# Patient Record
Sex: FEMALE | Race: WHITE | URBAN - METROPOLITAN AREA
[De-identification: names, ages, dates, MRNs, and addresses within clinical notes are randomized per-mention and may not be internally consistent; named-entity substitution may affect disease eponyms.]

---

## 2019-04-07 ENCOUNTER — HOSPITAL ENCOUNTER (OUTPATIENT)
Dept: ADMINISTRATIVE | Facility: HOSPITAL | Age: 49
End: 2019-04-09
Attending: HOSPITALIST | Admitting: INTERNAL MEDICINE

## 2019-04-07 LAB
ABS NEUT (OLG): 3.72 X10(3)/MCL (ref 2.1–9.2)
ALBUMIN SERPL-MCNC: 3.1 GM/DL (ref 3.4–5)
ALBUMIN/GLOB SERPL: 1.1 RATIO (ref 1.1–2)
ALP SERPL-CCNC: 72 UNIT/L (ref 38–126)
ALT SERPL-CCNC: 34 UNIT/L (ref 12–78)
AST SERPL-CCNC: 28 UNIT/L (ref 15–37)
BASOPHILS # BLD AUTO: 0 X10(3)/MCL (ref 0–0.2)
BASOPHILS NFR BLD AUTO: 1 %
BILIRUB SERPL-MCNC: 0.3 MG/DL (ref 0.2–1)
BILIRUBIN DIRECT+TOT PNL SERPL-MCNC: 0.1 MG/DL (ref 0–0.5)
BILIRUBIN DIRECT+TOT PNL SERPL-MCNC: 0.2 MG/DL (ref 0–0.8)
BUN SERPL-MCNC: 30 MG/DL (ref 7–18)
CALCIUM SERPL-MCNC: 8.1 MG/DL (ref 8.5–10.1)
CHLORIDE SERPL-SCNC: 111 MMOL/L (ref 98–107)
CO2 SERPL-SCNC: 24 MMOL/L (ref 21–32)
CREAT SERPL-MCNC: 1.43 MG/DL (ref 0.55–1.02)
ERYTHROCYTE [DISTWIDTH] IN BLOOD BY AUTOMATED COUNT: 14.6 % (ref 11.5–17)
GLOBULIN SER-MCNC: 2.7 GM/DL (ref 2.4–3.5)
GLUCOSE SERPL-MCNC: 95 MG/DL (ref 74–106)
HCT VFR BLD AUTO: 33.8 % (ref 37–47)
HGB BLD-MCNC: 10.8 GM/DL (ref 12–16)
INR PPP: 1 (ref 0–1.3)
LYMPHOCYTES # BLD AUTO: 2.2 X10(3)/MCL (ref 0.6–4.6)
LYMPHOCYTES NFR BLD AUTO: 34 %
MAGNESIUM SERPL-MCNC: 1.8 MG/DL (ref 1.8–2.4)
MCH RBC QN AUTO: 28.7 PG (ref 27–31)
MCHC RBC AUTO-ENTMCNC: 32 GM/DL (ref 33–36)
MCV RBC AUTO: 89.9 FL (ref 80–94)
MONOCYTES # BLD AUTO: 0.3 X10(3)/MCL (ref 0.1–1.3)
MONOCYTES NFR BLD AUTO: 5 %
NEUTROPHILS # BLD AUTO: 3.72 X10(3)/MCL (ref 2.1–9.2)
NEUTROPHILS NFR BLD AUTO: 60 %
PLATELET # BLD AUTO: 188 X10(3)/MCL (ref 130–400)
PMV BLD AUTO: 10.7 FL (ref 9.4–12.4)
POC TROPONIN: 0 NG/ML (ref 0–0.08)
POTASSIUM SERPL-SCNC: 4.3 MMOL/L (ref 3.5–5.1)
PROT SERPL-MCNC: 5.8 GM/DL (ref 6.4–8.2)
PROTHROMBIN TIME: 13.8 SECOND(S) (ref 12–14)
RBC # BLD AUTO: 3.76 X10(6)/MCL (ref 4.2–5.4)
SODIUM SERPL-SCNC: 144 MMOL/L (ref 136–145)
WBC # SPEC AUTO: 6.3 X10(3)/MCL (ref 4.5–11.5)

## 2019-04-08 LAB
ABS NEUT (OLG): 4.03 X10(3)/MCL (ref 2.1–9.2)
AMPHET UR QL SCN: ABNORMAL
APPEARANCE, UA: CLEAR
BACTERIA SPEC CULT: ABNORMAL /HPF
BARBITURATE SCN PRESENT UR: ABNORMAL
BASOPHILS # BLD AUTO: 0 X10(3)/MCL (ref 0–0.2)
BASOPHILS NFR BLD AUTO: 0 %
BENZODIAZ UR QL SCN: ABNORMAL
BILIRUB UR QL STRIP: NEGATIVE
BUN SERPL-MCNC: 29 MG/DL (ref 7–18)
CALCIUM SERPL-MCNC: 7.8 MG/DL (ref 8.5–10.1)
CANNABINOIDS UR QL SCN: ABNORMAL
CHLORIDE SERPL-SCNC: 109 MMOL/L (ref 98–107)
CO2 SERPL-SCNC: 24 MMOL/L (ref 21–32)
COCAINE UR QL SCN: ABNORMAL
COLOR UR: YELLOW
CREAT SERPL-MCNC: 1.35 MG/DL (ref 0.55–1.02)
CREAT/UREA NIT SERPL: 21.5
ERYTHROCYTE [DISTWIDTH] IN BLOOD BY AUTOMATED COUNT: 14.7 % (ref 11.5–17)
GLUCOSE (UA): ABNORMAL
GLUCOSE SERPL-MCNC: 203 MG/DL (ref 74–106)
HCT VFR BLD AUTO: 30.6 % (ref 37–47)
HGB BLD-MCNC: 9.8 GM/DL (ref 12–16)
HGB UR QL STRIP: NEGATIVE
KETONES UR QL STRIP: NEGATIVE
LEUKOCYTE ESTERASE UR QL STRIP: ABNORMAL
LYMPHOCYTES # BLD AUTO: 2.2 X10(3)/MCL (ref 0.6–4.6)
LYMPHOCYTES NFR BLD AUTO: 33 %
MCH RBC QN AUTO: 28.6 PG (ref 27–31)
MCHC RBC AUTO-ENTMCNC: 32 GM/DL (ref 33–36)
MCV RBC AUTO: 89.2 FL (ref 80–94)
MONOCYTES # BLD AUTO: 0.4 X10(3)/MCL (ref 0.1–1.3)
MONOCYTES NFR BLD AUTO: 6 %
NEUTROPHILS # BLD AUTO: 4.03 X10(3)/MCL (ref 2.1–9.2)
NEUTROPHILS NFR BLD AUTO: 61 %
NITRITE UR QL STRIP: NEGATIVE
OPIATES UR QL SCN: ABNORMAL
PCP UR QL: ABNORMAL
PH UR STRIP.AUTO: 5 [PH] (ref 5–7.5)
PH UR STRIP: 5 [PH] (ref 5–9)
PLATELET # BLD AUTO: 184 X10(3)/MCL (ref 130–400)
PMV BLD AUTO: 10.8 FL (ref 9.4–12.4)
POTASSIUM SERPL-SCNC: 4.2 MMOL/L (ref 3.5–5.1)
PROT UR QL STRIP: NEGATIVE
RBC # BLD AUTO: 3.43 X10(6)/MCL (ref 4.2–5.4)
RBC #/AREA URNS HPF: ABNORMAL /[HPF]
SODIUM SERPL-SCNC: 141 MMOL/L (ref 136–145)
SP GR FLD REFRACTOMETRY: 1.05 (ref 1–1.03)
SP GR UR STRIP: 1.05 (ref 1–1.03)
SQUAMOUS EPITHELIAL, UA: 6 /HPF (ref 0–4)
TROPONIN I SERPL-MCNC: <0.02 NG/ML (ref 0.02–0.49)
UROBILINOGEN UR STRIP-ACNC: 0.2
WBC # SPEC AUTO: 6.6 X10(3)/MCL (ref 4.5–11.5)
WBC #/AREA URNS HPF: 7 /HPF (ref 0–3)

## 2019-04-09 LAB
BUN SERPL-MCNC: 25 MG/DL (ref 7–18)
CALCIUM SERPL-MCNC: 8 MG/DL (ref 8.5–10.1)
CHLORIDE SERPL-SCNC: 107 MMOL/L (ref 98–107)
CK MB SERPL-MCNC: <1 NG/ML (ref 0.5–3.6)
CK SERPL-CCNC: 56 UNIT/L (ref 26–192)
CO2 SERPL-SCNC: 29 MMOL/L (ref 21–32)
CREAT SERPL-MCNC: 1.3 MG/DL (ref 0.55–1.02)
CREAT/UREA NIT SERPL: 19.2
GLUCOSE SERPL-MCNC: 169 MG/DL (ref 74–106)
POTASSIUM SERPL-SCNC: 4.4 MMOL/L (ref 3.5–5.1)
SODIUM SERPL-SCNC: 142 MMOL/L (ref 136–145)
TROPONIN I SERPL-MCNC: <0.02 NG/ML (ref 0.02–0.49)

## 2019-05-13 ENCOUNTER — HISTORICAL (OUTPATIENT)
Dept: ENDOSCOPY | Facility: HOSPITAL | Age: 49
End: 2019-05-13

## 2019-06-29 ENCOUNTER — HOSPITAL ENCOUNTER (OUTPATIENT)
Dept: NUTRITION | Facility: HOSPITAL | Age: 49
End: 2019-07-01
Attending: INTERNAL MEDICINE | Admitting: INTERNAL MEDICINE

## 2019-06-29 LAB
ABS NEUT (OLG): 4.06 X10(3)/MCL (ref 2.1–9.2)
ALBUMIN SERPL-MCNC: 4.1 GM/DL (ref 3.4–5)
ALBUMIN/GLOB SERPL: 1.1 RATIO (ref 1.1–2)
ALP SERPL-CCNC: 112 UNIT/L (ref 38–126)
ALT SERPL-CCNC: 49 UNIT/L (ref 12–78)
APTT PPP: 29.8 SECOND(S) (ref 24.2–33.9)
AST SERPL-CCNC: 26 UNIT/L (ref 15–37)
BASOPHILS # BLD AUTO: 0 X10(3)/MCL (ref 0–0.2)
BASOPHILS NFR BLD AUTO: 1 %
BILIRUB SERPL-MCNC: 0.4 MG/DL (ref 0.2–1)
BILIRUBIN DIRECT+TOT PNL SERPL-MCNC: 0.1 MG/DL (ref 0–0.5)
BILIRUBIN DIRECT+TOT PNL SERPL-MCNC: 0.3 MG/DL (ref 0–0.8)
BNP BLD-MCNC: 70 PG/ML (ref 0–100)
BUN SERPL-MCNC: 22 MG/DL (ref 7–18)
CALCIUM SERPL-MCNC: 8.4 MG/DL (ref 8.5–10.1)
CHLORIDE SERPL-SCNC: 107 MMOL/L (ref 98–107)
CO2 SERPL-SCNC: 28 MMOL/L (ref 21–32)
CREAT SERPL-MCNC: 1.21 MG/DL (ref 0.55–1.02)
ERYTHROCYTE [DISTWIDTH] IN BLOOD BY AUTOMATED COUNT: 14.5 % (ref 11.5–17)
GLOBULIN SER-MCNC: 3.6 GM/DL (ref 2.4–3.5)
GLUCOSE SERPL-MCNC: 140 MG/DL (ref 74–106)
HCT VFR BLD AUTO: 32.6 % (ref 37–47)
HGB BLD-MCNC: 10.8 GM/DL (ref 12–16)
INR PPP: 1 (ref 0–1.3)
LYMPHOCYTES # BLD AUTO: 2.1 X10(3)/MCL (ref 0.6–4.6)
LYMPHOCYTES NFR BLD AUTO: 32 %
MCH RBC QN AUTO: 27.8 PG (ref 27–31)
MCHC RBC AUTO-ENTMCNC: 33.1 GM/DL (ref 33–36)
MCV RBC AUTO: 83.8 FL (ref 80–94)
MONOCYTES # BLD AUTO: 0.5 X10(3)/MCL (ref 0.1–1.3)
MONOCYTES NFR BLD AUTO: 7 %
NEUTROPHILS # BLD AUTO: 4.06 X10(3)/MCL (ref 2.1–9.2)
NEUTROPHILS NFR BLD AUTO: 61 %
PLATELET # BLD AUTO: 224 X10(3)/MCL (ref 130–400)
PMV BLD AUTO: 9.7 FL (ref 9.4–12.4)
POC TROPONIN: 0 NG/ML (ref 0–0.08)
POTASSIUM SERPL-SCNC: 3.5 MMOL/L (ref 3.5–5.1)
PROT SERPL-MCNC: 7.7 GM/DL (ref 6.4–8.2)
PROTHROMBIN TIME: 13.5 SECOND(S) (ref 12–14)
RBC # BLD AUTO: 3.89 X10(6)/MCL (ref 4.2–5.4)
SODIUM SERPL-SCNC: 140 MMOL/L (ref 136–145)
TROPONIN I SERPL-MCNC: <0.02 NG/ML (ref 0.02–0.49)
WBC # SPEC AUTO: 6.7 X10(3)/MCL (ref 4.5–11.5)

## 2019-06-30 LAB
CK MB SERPL-MCNC: 0.7 NG/ML (ref 0.5–3.6)
CK MB SERPL-MCNC: <0.5 NG/ML (ref 0.5–3.6)
CK SERPL-CCNC: 34 UNIT/L (ref 26–192)
CK SERPL-CCNC: 39 UNIT/L (ref 26–192)
MAGNESIUM SERPL-MCNC: 1.7 MG/DL (ref 1.8–2.4)
POTASSIUM SERPL-SCNC: 3.2 MMOL/L (ref 3.5–5.1)
TROPONIN I SERPL-MCNC: <0.02 NG/ML (ref 0.02–0.49)
TROPONIN I SERPL-MCNC: <0.02 NG/ML (ref 0.02–0.49)

## 2019-07-01 LAB
BUN SERPL-MCNC: 21 MG/DL (ref 7–18)
CALCIUM SERPL-MCNC: 8.3 MG/DL (ref 8.5–10.1)
CHLORIDE SERPL-SCNC: 102 MMOL/L (ref 98–107)
CO2 SERPL-SCNC: 24 MMOL/L (ref 21–32)
CREAT SERPL-MCNC: 1.09 MG/DL (ref 0.55–1.02)
CREAT/UREA NIT SERPL: 19.3
GLUCOSE SERPL-MCNC: 188 MG/DL (ref 74–106)
MAGNESIUM SERPL-MCNC: 2.3 MG/DL (ref 1.8–2.4)
POTASSIUM SERPL-SCNC: 4.1 MMOL/L (ref 3.5–5.1)
SODIUM SERPL-SCNC: 137 MMOL/L (ref 136–145)

## 2019-07-18 ENCOUNTER — HISTORICAL (OUTPATIENT)
Dept: CARDIOLOGY | Facility: HOSPITAL | Age: 49
End: 2019-07-18

## 2019-08-14 ENCOUNTER — HISTORICAL (OUTPATIENT)
Dept: ADMINISTRATIVE | Facility: HOSPITAL | Age: 49
End: 2019-08-14

## 2019-08-22 ENCOUNTER — HISTORICAL (OUTPATIENT)
Dept: ADMINISTRATIVE | Facility: HOSPITAL | Age: 49
End: 2019-08-22

## 2019-09-12 ENCOUNTER — HOSPITAL ENCOUNTER (OUTPATIENT)
Dept: NUTRITION | Facility: HOSPITAL | Age: 49
End: 2019-09-13
Attending: INTERNAL MEDICINE | Admitting: INTERNAL MEDICINE

## 2019-09-12 LAB
ABS NEUT (OLG): 4.56 X10(3)/MCL (ref 2.1–9.2)
ALBUMIN SERPL-MCNC: 4.2 GM/DL (ref 3.4–5)
ALBUMIN/GLOB SERPL: 1.3 RATIO (ref 1.1–2)
ALP SERPL-CCNC: 88 UNIT/L (ref 38–126)
ALT SERPL-CCNC: 16 UNIT/L (ref 12–78)
AMPHET UR QL SCN: NEGATIVE
APPEARANCE, UA: CLEAR
APTT PPP: 28.1 SECOND(S) (ref 24.2–33.9)
AST SERPL-CCNC: 14 UNIT/L (ref 15–37)
BACTERIA SPEC CULT: ABNORMAL /HPF
BARBITURATE SCN PRESENT UR: NEGATIVE
BASOPHILS # BLD AUTO: 0 X10(3)/MCL (ref 0–0.2)
BASOPHILS NFR BLD AUTO: 1 %
BENZODIAZ UR QL SCN: POSITIVE
BILIRUB SERPL-MCNC: 0.5 MG/DL (ref 0.2–1)
BILIRUB UR QL STRIP: NEGATIVE
BILIRUBIN DIRECT+TOT PNL SERPL-MCNC: 0.1 MG/DL (ref 0–0.5)
BILIRUBIN DIRECT+TOT PNL SERPL-MCNC: 0.4 MG/DL (ref 0–0.8)
BNP BLD-MCNC: 438 PG/ML (ref 0–125)
BNP BLD-MCNC: 812 PG/ML (ref 0–100)
BUN SERPL-MCNC: 11 MG/DL (ref 7–18)
CALCIUM SERPL-MCNC: 8.6 MG/DL (ref 8.5–10.1)
CANNABINOIDS UR QL SCN: NEGATIVE
CHLORIDE SERPL-SCNC: 104 MMOL/L (ref 98–107)
CK MB SERPL-MCNC: <0.5 NG/ML (ref 0.5–3.6)
CK MB SERPL-MCNC: <0.5 NG/ML (ref 0.5–3.6)
CK SERPL-CCNC: 23 UNIT/L (ref 26–192)
CK SERPL-CCNC: 31 UNIT/L (ref 26–192)
CO2 SERPL-SCNC: 25 MMOL/L (ref 21–32)
COCAINE UR QL SCN: NEGATIVE
COLOR UR: YELLOW
CREAT SERPL-MCNC: 1.04 MG/DL (ref 0.55–1.02)
EOSINOPHIL # BLD AUTO: 0 X10(3)/MCL (ref 0–0.9)
EOSINOPHIL NFR BLD AUTO: 1 %
ERYTHROCYTE [DISTWIDTH] IN BLOOD BY AUTOMATED COUNT: 12.9 % (ref 11.5–17)
GLOBULIN SER-MCNC: 3.3 GM/DL (ref 2.4–3.5)
GLUCOSE (UA): NEGATIVE
GLUCOSE SERPL-MCNC: 139 MG/DL (ref 74–106)
HCT VFR BLD AUTO: 36.5 % (ref 37–47)
HGB BLD-MCNC: 11.9 GM/DL (ref 12–16)
HGB UR QL STRIP: NEGATIVE
INR PPP: 1.1 (ref 0–1.3)
KETONES UR QL STRIP: ABNORMAL
LEUKOCYTE ESTERASE UR QL STRIP: NEGATIVE
LYMPHOCYTES # BLD AUTO: 1.4 X10(3)/MCL (ref 0.6–4.6)
LYMPHOCYTES NFR BLD AUTO: 21 %
MAGNESIUM SERPL-MCNC: 1.8 MG/DL (ref 1.8–2.4)
MCH RBC QN AUTO: 27.5 PG (ref 27–31)
MCHC RBC AUTO-ENTMCNC: 32.6 GM/DL (ref 33–36)
MCV RBC AUTO: 84.5 FL (ref 80–94)
MONOCYTES # BLD AUTO: 0.5 X10(3)/MCL (ref 0.1–1.3)
MONOCYTES NFR BLD AUTO: 7 %
NEUTROPHILS # BLD AUTO: 4.56 X10(3)/MCL (ref 2.1–9.2)
NEUTROPHILS NFR BLD AUTO: 67 %
NITRITE UR QL STRIP: NEGATIVE
OPIATES UR QL SCN: POSITIVE
PCP UR QL: NEGATIVE
PH UR STRIP.AUTO: 5.5 [PH] (ref 5–7.5)
PH UR STRIP: 5.5 [PH] (ref 5–9)
PHOSPHATE SERPL-MCNC: 4 MG/DL (ref 2.5–4.9)
PLATELET # BLD AUTO: 317 X10(3)/MCL (ref 130–400)
PMV BLD AUTO: 9.6 FL (ref 9.4–12.4)
POC TROPONIN: 0 NG/ML (ref 0–0.08)
POTASSIUM SERPL-SCNC: 3.5 MMOL/L (ref 3.5–5.1)
PROT SERPL-MCNC: 7.5 GM/DL (ref 6.4–8.2)
PROT UR QL STRIP: ABNORMAL
PROTHROMBIN TIME: 13.9 SECOND(S) (ref 12–14)
RBC # BLD AUTO: 4.32 X10(6)/MCL (ref 4.2–5.4)
RBC #/AREA URNS HPF: ABNORMAL /[HPF]
SODIUM SERPL-SCNC: 140 MMOL/L (ref 136–145)
SP GR FLD REFRACTOMETRY: 1.02 (ref 1–1.03)
SP GR UR STRIP: 1.02 (ref 1–1.03)
SQUAMOUS EPITHELIAL, UA: ABNORMAL
TROPONIN I SERPL-MCNC: <0.02 NG/ML (ref 0.02–0.49)
TSH SERPL-ACNC: 3.18 MIU/L (ref 0.36–3.74)
UROBILINOGEN UR STRIP-ACNC: 0.2
WBC # SPEC AUTO: 6.8 X10(3)/MCL (ref 4.5–11.5)
WBC #/AREA URNS HPF: ABNORMAL /[HPF]

## 2019-09-13 LAB
CK MB SERPL-MCNC: <0.5 NG/ML (ref 0.5–3.6)
CK SERPL-CCNC: 36 UNIT/L (ref 26–192)
TROPONIN I SERPL-MCNC: <0.02 NG/ML (ref 0.02–0.49)

## 2019-10-29 ENCOUNTER — HOSPITAL ENCOUNTER (OUTPATIENT)
Dept: HOSPITAL 67 - MAMMO | Age: 49
Discharge: HOME | End: 2019-10-29
Attending: NURSE PRACTITIONER
Payer: COMMERCIAL

## 2019-10-29 DIAGNOSIS — Z12.31: Primary | ICD-10-CM

## 2020-02-24 ENCOUNTER — HOSPITAL ENCOUNTER (OUTPATIENT)
Dept: MEDSURG UNIT | Facility: HOSPITAL | Age: 50
End: 2020-02-28
Attending: INTERNAL MEDICINE | Admitting: INTERNAL MEDICINE

## 2020-02-24 LAB
ABS NEUT (OLG): 4.73 X10(3)/MCL (ref 2.1–9.2)
ALBUMIN SERPL-MCNC: 4.3 GM/DL (ref 3.4–5)
ALBUMIN/GLOB SERPL: 1.2 RATIO (ref 1.1–2)
ALP SERPL-CCNC: 117 UNIT/L (ref 38–126)
ALT SERPL-CCNC: 47 UNIT/L (ref 12–78)
APTT PPP: 36.7 SECOND(S) (ref 23.2–33.7)
AST SERPL-CCNC: 57 UNIT/L (ref 15–37)
BASOPHILS # BLD AUTO: 0 X10(3)/MCL (ref 0–0.2)
BASOPHILS NFR BLD AUTO: 1 %
BILIRUB SERPL-MCNC: 0.8 MG/DL (ref 0.2–1)
BILIRUBIN DIRECT+TOT PNL SERPL-MCNC: 0 MG/DL (ref 0–0.5)
BILIRUBIN DIRECT+TOT PNL SERPL-MCNC: 0.8 MG/DL (ref 0–0.8)
BNP BLD-MCNC: 22 PG/ML (ref 0–100)
BUN SERPL-MCNC: 17 MG/DL (ref 7–18)
CALCIUM SERPL-MCNC: 9.2 MG/DL (ref 8.5–10.1)
CHLORIDE SERPL-SCNC: 102 MMOL/L (ref 98–107)
CO2 SERPL-SCNC: 28 MMOL/L (ref 21–32)
CREAT SERPL-MCNC: 0.92 MG/DL (ref 0.55–1.02)
EOSINOPHIL # BLD AUTO: 0 X10(3)/MCL (ref 0–0.9)
EOSINOPHIL NFR BLD AUTO: 1 %
ERYTHROCYTE [DISTWIDTH] IN BLOOD BY AUTOMATED COUNT: 15.5 % (ref 11.5–17)
GLOBULIN SER-MCNC: 3.5 GM/DL (ref 2.4–3.5)
GLUCOSE SERPL-MCNC: 125 MG/DL (ref 74–106)
HCT VFR BLD AUTO: 35.3 % (ref 37–47)
HGB BLD-MCNC: 11.5 GM/DL (ref 12–16)
INR PPP: 1 (ref 0–1.3)
LYMPHOCYTES # BLD AUTO: 1.4 X10(3)/MCL (ref 0.6–4.6)
LYMPHOCYTES NFR BLD AUTO: 21 %
MCH RBC QN AUTO: 29 PG (ref 27–31)
MCHC RBC AUTO-ENTMCNC: 32.6 GM/DL (ref 33–36)
MCV RBC AUTO: 88.9 FL (ref 80–94)
MONOCYTES # BLD AUTO: 0.3 X10(3)/MCL (ref 0.1–1.3)
MONOCYTES NFR BLD AUTO: 5 %
NEUTROPHILS # BLD AUTO: 4.73 X10(3)/MCL (ref 2.1–9.2)
NEUTROPHILS NFR BLD AUTO: 72 %
PLATELET # BLD AUTO: 239 X10(3)/MCL (ref 130–400)
PMV BLD AUTO: 10.3 FL (ref 9.4–12.4)
POC TROPONIN: 0 NG/ML (ref 0–0.08)
POTASSIUM SERPL-SCNC: 4.7 MMOL/L (ref 3.5–5.1)
PROT SERPL-MCNC: 7.8 GM/DL (ref 6.4–8.2)
PROTHROMBIN TIME: 12.7 SECOND(S) (ref 11.1–13.7)
RBC # BLD AUTO: 3.97 X10(6)/MCL (ref 4.2–5.4)
SODIUM SERPL-SCNC: 138 MMOL/L (ref 136–145)
TROPONIN I SERPL-MCNC: <0.02 NG/ML (ref 0.02–0.49)
WBC # SPEC AUTO: 6.5 X10(3)/MCL (ref 4.5–11.5)

## 2020-02-25 LAB
TROPONIN I SERPL-MCNC: <0.02 NG/ML (ref 0.02–0.49)
TROPONIN I SERPL-MCNC: <0.02 NG/ML (ref 0.02–0.49)

## 2020-02-28 LAB — GRAM STN SPEC: NORMAL

## 2020-03-03 LAB — FINAL CULTURE: NORMAL

## 2020-05-01 ENCOUNTER — HISTORICAL (OUTPATIENT)
Dept: ADMINISTRATIVE | Facility: HOSPITAL | Age: 50
End: 2020-05-01

## 2020-06-09 ENCOUNTER — HISTORICAL (OUTPATIENT)
Dept: SURGERY | Facility: HOSPITAL | Age: 50
End: 2020-06-09

## 2021-01-20 ENCOUNTER — HOSPITAL ENCOUNTER (EMERGENCY)
Dept: HOSPITAL 67 - ED | Age: 51
Discharge: HOME | End: 2021-01-20
Payer: COMMERCIAL

## 2021-01-20 VITALS — WEIGHT: 118 LBS | BODY MASS INDEX: 19.66 KG/M2 | HEIGHT: 65 IN

## 2021-01-20 VITALS — DIASTOLIC BLOOD PRESSURE: 74 MMHG | TEMPERATURE: 98.6 F | SYSTOLIC BLOOD PRESSURE: 110 MMHG

## 2021-01-20 DIAGNOSIS — Z23: Primary | ICD-10-CM

## 2021-01-20 PROCEDURE — 90471 IMMUNIZATION ADMIN: CPT

## 2021-01-20 PROCEDURE — 99282 EMERGENCY DEPT VISIT SF MDM: CPT

## 2021-01-20 PROCEDURE — 90715 TDAP VACCINE 7 YRS/> IM: CPT

## 2021-03-08 ENCOUNTER — HOSPITAL ENCOUNTER (OUTPATIENT)
Dept: HOSPITAL 67 - US | Age: 51
Discharge: HOME | End: 2021-03-08
Attending: PHYSICIAN ASSISTANT
Payer: COMMERCIAL

## 2021-03-08 DIAGNOSIS — R22.1: Primary | ICD-10-CM

## 2021-03-08 DIAGNOSIS — E53.8: ICD-10-CM

## 2022-02-02 ENCOUNTER — HOSPITAL ENCOUNTER (OUTPATIENT)
Dept: HOSPITAL 67 - CT | Age: 52
Discharge: HOME | End: 2022-02-02
Attending: INTERNAL MEDICINE
Payer: COMMERCIAL

## 2022-02-02 DIAGNOSIS — R10.9: Primary | ICD-10-CM

## 2022-04-10 ENCOUNTER — HISTORICAL (OUTPATIENT)
Dept: ADMINISTRATIVE | Facility: HOSPITAL | Age: 52
End: 2022-04-10

## 2022-04-18 ENCOUNTER — HOSPITAL ENCOUNTER (OUTPATIENT)
Dept: HOSPITAL 67 - RAD | Age: 52
Discharge: HOME | End: 2022-04-18
Attending: INTERNAL MEDICINE
Payer: COMMERCIAL

## 2022-04-18 DIAGNOSIS — M85.88: ICD-10-CM

## 2022-04-18 DIAGNOSIS — C50.411: Primary | ICD-10-CM

## 2022-04-18 DIAGNOSIS — D03.62: ICD-10-CM

## 2022-04-25 VITALS
WEIGHT: 190 LBS | SYSTOLIC BLOOD PRESSURE: 121 MMHG | BODY MASS INDEX: 31.65 KG/M2 | HEIGHT: 65 IN | DIASTOLIC BLOOD PRESSURE: 62 MMHG

## 2022-04-29 NOTE — ED PROVIDER NOTES
"   Patient:   Sandra Woodson             MRN: [id number]            FIN: [id number]               Age:   49 years     Sex:  Female     :  1970   Associated Diagnoses:   Acute chest pain   Author:   River Barahona III, MD      Basic Information   Time seen: Date & time 2020 16:07:00.   History source: Patient.   Arrival mode: Private vehicle, walking.   History limitation: None.   Additional information: Patient's physician(s): Wang Rangel MD      History of Present Illness   The patient presents with 49-year-old female with history of CABG presents for left side chest pain radiating down her left arm worsening yesterday.  Patient reports to having elevated blood pressure and feeling weak for the past week. Took nitro 45 min ago. Bilateral LE edema.  Cardiology Dr. Rangel. LENA Loo PA-C and     SANDRA, Dr. Barahona, assumed care of this patient at 1923.    48 y/o CF with hx of MI(x2), CAD, and CABG in 2019 presents to ED with complaints of left-sided chest pain that started this morning.  Associated sx include flushing to bilateral arms/chest, weakness, fatigue, nausea, hand numbness, feeling "cold", atraumatic LUE pain, and atraumatic left shoulder pain. Patient reports she took her BP at home which was 190/110 therefore she took NTG and ASA which alleviated the chest pain, however did not resolve the shoulder pain. Patient states the sx differ from previous MI because there is no accompanying SOB and center chest pain. She denies a fever.    .  The onset was today.  The course/duration of symptoms is constant.  Location: Left chest. Radiating pain: shoulder(s). The character of symptoms is "pain".  The degree at onset was moderate.  The degree at maximum was moderate.  The degree at present is moderate.  The exacerbating factor is none.  The relieving factor is none.  Risk factors consist of coronary artery disease, hypertension and MI.  Prior episodes: myocardial infarction times 2.  " Therapy today Nitroglycerin and Aspirin.  Associated symptoms: nausea, anxiety and denies shortness of breath.        Review of Systems   Constitutional symptoms:  Weakness, fatigue, no fever, no chills, no sweats.    Skin symptoms:  No rash,    Eye symptoms:  No recent vision problems   ENMT symptoms:  No ear pain,    Respiratory symptoms:  Shortness of breath, no orthopnea   Cardiovascular symptoms:  Chest pain, no palpitations   Gastrointestinal symptoms:  Nausea, no abdominal pain, no vomiting, no diarrhea   Genitourinary symptoms:  No dysuria, no hematuria.    Musculoskeletal symptoms:  LUE pain, Shoulder painNo back pain, no Muscle pain.    , hand numbness. Psychiatric symptoms:  No anxiety, no depression.    Allergy/immunologic symptoms:  No seasonal allergies, no food allergies             Additional review of systems information: All other systems reviewed and otherwise negative.      Health Status   Allergies:    Allergic Reactions (Selected)  No Known Medication Allergies.   Medications:  (Selected)   Prescriptions  Prescribed  Coreg 6.25 mg oral tablet: 6.25 mg = 1 tab(s), Oral, BID, Please hold if BP<100/60, # 60 tab(s), 11 Refill(s)  aspirin 81 mg oral tablet, CHEWABLE: 81 mg = 1 tab(s), Oral, Daily, # 30 tab(s), 0 Refill(s)  atorvastatin 40 mg oral tablet: 40 mg = 1 tab(s), Oral, Daily, # 30 tab(s), 0 Refill(s)  cloniDINE 0.1 mg oral tablet: 0.1 mg = 1 tab(s), Oral, BID, PRN PRN hypertension, # 20 tab(s), 0 Refill(s)  hydrOXYzine hydrochloride 25 mg oral tablet: 25 mg = 1 tab(s), Oral, At Bedtime, PRN PRN as needed for anxiety, # 25 tab(s), 0 Refill(s)  Documented Medications  Documented  NIFEdipine 60 mg oral tablet, extended release: 60 mg = 1 tab(s), Oral, qPM  Plavix 75 mg oral tablet: 75 mg = 1 tab(s), Oral, Daily, 0 Refill(s)  Xanax 1 mg oral tablet: 1 mg = 1 tab(s), Oral, TID, PRN PRN for anxiety, 0 Refill(s)  levothyroxine 50 mcg (0.05 mg) oral tablet: 50 mcg = 1 tab(s), Oral, qAM  losartan  100 mg oral tablet: 100 mg = 1 tab(s), Oral, Daily  nitroglycerin 0.4 mg sublingual TAB: 0.4 mg = 1 tab(s), SL, q5min, PRN PRN for chest pain, # 1 bottle(s), 0 Refill(s)  sertraline 50 mg oral tablet: 50 mg = 1 tab(s), Oral, Daily  tiZANidine 4 mg oral tablet: 4 mg = 1 tab(s), Oral, q8hr  zolpidem 12.5 mg oral tablet, extended release: 12.5 mg = 1 tab(s), Oral, qPM.      Past Medical/ Family/ Social History   Medical history:    Resolved  Asthma ([id number]):  Resolved.  Comments:  7/31/2019 CDT 15:21 CDT - Juanito CONTRERAS, [facility]  as child  Kidney infection ([id number]):  Resolved..   Surgical history:    Bypass CABG (.) on 8/1/2019 at 48 Years.  Comments:  8/1/2019 10:48 CDT - Bishop CONTRERAS, [facility]  auto-populated from documented surgical case  Colonoscopy (None) on 5/13/2019 at 48 Years.  Comments:  5/14/2019 14:22 CDT - Genoveva Welch LPN  auto-populated from documented surgical case  knee meniscus in 2015 at 45 Years.  Knee meniscus ([id number]) in 2013 at 43 Years.  Oophorectomy (122246443) in 2009 at 39 Years.  Cholecystectomy ([id number]) in 2005 at 35 Years.  H/O: hysterectomy (744433376) in 2003 at 33 Years.  cardiac stent placement.  Esophagogastroduodenoscopy ([id number]).  Extraction of wisdom tooth ([id number]).  EA - Endometrial ablation (830400184).  TL - Tubal ligation ([id number])..   Family history:    Throat cancer  Father  Breast cancer  Mother  .   Social history: Tobacco use: Former.   Problem list:    Active Problems (11)  Anemia, iron deficiency   Anxiety and depression   CAD (coronary artery disease)   Chest pain   History of hypothyroidism   HLD - Hyperlipidemia   Hypothyroidism   MI - myocardial infarction   Morbid obesity   Pain in both legs   SOB - Shortness of breath   .      Physical Examination               Vital Signs   Vital Signs   2/24/2020 16:06 CST      Temperature Temporal Artery               36.8 DegC                             Peripheral Pulse Rate     87 bpm                              Respiratory Rate          18 br/min                             SpO2                      99 %                             Oxygen Therapy            Room air                             Systolic Blood Pressure   190 mmHg  HI                             Diastolic Blood Pressure  120 mmHg  HI  .   Measurements   2/24/2020 16:06 CST      Weight Dosing             93 kg                             Weight Measured           93 kg                             Weight Measured and Calculated in Lbs     205.03 lb                             Height/Length Dosing      165 cm                             Height/Length Measured    165 cm                             Body Mass Index Measured  34.16 kg/m2  .   Basic Oxygen Information   2/24/2020 18:30 CST      SpO2                      99 %                             Oxygen Therapy            Room air  General:  Alert, no acute distress, Obese   Neurological:  Alert and oriented to person, place, time, and situation, No focal neurological deficit observed, CN II-XII intact, normal sensory observed, normal motor observed, normal speech observed   Skin:  Warm, pink, intact, moist, no rash   Head:  Normocephalic, atraumatic   Cardiovascular:  Regular rate and rhythm, No murmur, Normal peripheral perfusion, No edema.    Respiratory:  Lungs are clear to auscultation, respirations are non-labored, breath sounds are equal, Symmetrical chest wall expansion.    Musculoskeletal:  Normal ROM, normal strength   Gastrointestinal:  Soft, Nontender, Non distended, Normal bowel sounds   Lymphatics:  No lymphadenopathy.   Psychiatric:  Cooperative, appropriate mood & affect, normal judgment      Medical Decision Making   Documents reviewed:  Emergency department nurses' notes.   Orders  Launch Order Profile (Selected)   Inpatient Orders  Completed  Automated Diff: Now collect, 02/24/20 17:00:00 CST, Blood, Collected, Stop date 02/24/20 17:00:00 CST, Lab Collect, Print Label By Order Location,  20 16:09:00 CST  CBC w/ Auto Diff: Now collect, 20 16:09:00 CST, Blood, Stop date 20 16:11:00 CST, Lab Collect, Print Label By Order Location, 20 16:09:00 CST  CMP: Stat collect, 20 16:09:00 CST, Blood, Stop date 20 16:11:00 CST, Lab Collect, Print Label By Order Location, 20 16:09:00 CST  [id number] 16:10:00 CST, Stat, Once, 20 16:10:00 CST, Wheelchair, Patient Has IV, Standard Precautions, -1, -1, 20 16:10:00 CST  Estimated Glomerular Filtration Rate: Stat collect, 20 17:00:00 CST, Blood, Collected, Stop date 20 17:00:00 CST, Lab Collect, Print Label By Order Location, 20 16:09:00 CST  Normal Saline (0.9% NS) IV: 1,000 mL, 1,000 mL, IV, 1000 mL/hr, start date 20 16:10:00 CST, stop date 20 16:10:00 CST, STAT  POC BNP iSTAT request: Blood, Routine collect, 20 16:09:00 CST by Francine Bowden, Stop date 20 17:00:00 CST, Lab Collect, Print Label By Order Location  POC BNP iSTAT: Blood, Stat collect, Collected, 20 17:28:15 CST  POC Istat ER Troponin: Blood, Stat collect, Collected, 20 17:24:53 CST  POC iSTAT ER Troponin request: Blood, Stat collect, 20 16:09:00 CST by Francine Bowden, Stop date 20 16:11:00 CST, Lab Collect, Print Label By Order Location  PT: Stat collect, 20 16:09:00 CST, Blood, Stop date 20 16:11:00 CST, Lab Collect, Print Label By Order Location, 20 16:09:00 CST  PTT: Stat collect, 20 16:10:00 CST, Blood, Stop date 20 16:11:00 CST, Lab Collect, Print Label By Order Location, 20 16:10:00 CST  Troponin-I: Stat collect, 20 16:09:00 CST, Blood, Stop date 20 16:11:00 CST, Lab Collect, Print Label By Order Location, 20 16:09:00 CST  XR Chest 2 Views: Stat, 20 16:10:00 CST, Chest Pain, None, Wheelchair, Patient Has IV?, Rad Type, Not Scheduled, 20 16:10:00 CST  Zofran 2 mg/mL injectable solution: 4 mg, form:  Injection, IV Push, Once, first dose 02/24/20 16:10:00 CST, stop date 02/24/20 16:10:00 CST, STAT  morphine 4 mg/mL preservative-free intravenous solution: 4 mg, form: Injection, IV Push, Once, first dose 02/24/20 16:10:00 CST, stop date 02/24/20 16:10:00 CST, STAT  morphine: 4 mg, 1 mL, Injection, N/A, Once, Stop date 02/24/20 18:27:39 CST, Physician Stop, 02/24/20 18:27:39 CST.   Electrocardiogram:  Time 2/24/2020 16:09:00, rate 83, normal sinus rhythm, No ST-T changes, no ectopy, EP Interp, P wave and IA interval Poor R-wave progression, Left atrial enlargement .    Results review:  Lab results : Lab View   2/24/2020 17:28 CST      POC BNP iSTAT             22 pg/mL    2/24/2020 17:24 CST      POC Troponin              0.00 ng/mL    2/24/2020 17:00 CST      Sodium Lvl                138 mmol/L                             Potassium Lvl             4.7 mmol/L                             Chloride                  102 mmol/L                             CO2                       28.0 mmol/L                             Calcium Lvl               9.2 mg/dL                             Glucose Lvl               125 mg/dL  HI                             BUN                       17.0 mg/dL                             Creatinine                0.92 mg/dL                             eGFR-AA                   >60 mL/min/1.73 m2  NA                             eGFR-TANI                  >60 mL/min/1.73 m2  NA                             Bili Total                0.8 mg/dL                             Bili Direct               0.00 mg/dL                             Bili Indirect             0.80 mg/dL                             AST                       57 unit/L  HI                             ALT                       47 unit/L                             Alk Phos                  117 unit/L                             Total Protein             7.8 gm/dL                             Albumin Lvl               4.30 gm/dL                              Globulin                  3.50 gm/dL                             A/G Ratio                 1.2 ratio                             Troponin-I                <0.02 ng/mL  LOW                             PT                        12.7 second(s)                             INR                       1.0                             PTT                       36.7 second(s)  HI                             WBC                       6.5 x10(3)/mcL                             RBC                       3.97 x10(6)/mcL  LOW                             Hgb                       11.5 gm/dL  LOW                             Hct                       35.3 %  LOW                             Platelet                  239 x10(3)/mcL                             MCV                       88.9 fL                             MCH                       29.0 pg                             MCHC                      32.6 gm/dL  LOW                             RDW                       15.5 %                             MPV                       10.3 fL                             Abs Neut                  4.73 x10(3)/mcL                             Neutro Auto               72 %  NA                             Lymph Auto                21 %  NA                             Mono Auto                 5 %  NA                             Eos Auto                  1 %  NA                             Abs Eos                   0.0 x10(3)/mcL                             Basophil Auto             1 %  NA                             Abs Neutro                4.73 x10(3)/mcL                             Abs Lymph                 1.4 x10(3)/mcL                             Abs Mono                  0.3 x10(3)/mcL                             Abs Baso                  0.0 x10(3)/mcL  .   Radiology results:  Rad Results (ST)  < 12 hrs   Accession: [id number]  Order: XR Chest 2 Views  Report Dt/Tm: 02/24/2020 16:24  Report:   EXAMINATION  XR  Chest 2 Views     INDICATION  Chest Pain     Comparison: 31 December, 2019     FINDINGS  Lines/tubes/devices:  None present.     The cardiomediastinal silhouette and central pulmonary vasculature are  unremarkable. Mediastinal surgical changes are similar.  The trachea is midline. There is no lobar consolidation, pleural  effusion, or pneumothorax.     There is no acute osseous or extrathoracic abnormality.     IMPRESSION  No acute intrathoracic abnormality.  No significant interval change.    .      Reexamination/ Reevaluation   Time: 2/24/2020 20:54:00 .   Interventions: Patient some moderation of her chest pain with aspirin and nitroglycerin although no significant moderation of her musculoskeletal appearing left arm pain discussed case with Cal with CIPS will admit.      Impression and Plan   Diagnosis   Acute chest pain ([id number] R07.9)   Plan   Condition: Improved, Stable.    Disposition: Medically cleared, Admit time  2/24/2020 20:55:00, Admit to Inpatient Unit.    Follow up with: Primary Care Physician.    Counseled: Patient, Family, Regarding diagnosis, Regarding diagnostic results, Regarding treatment plan, Regarding prescription, Patient indicated understanding of instructions, Family verbalizes understanding. .    Notes: I, Theresa Leon, acted solely as a scribe for and in the presence of Dr. Barahona who performed the service.   ,       This scribes note accurately reflects the work done by me I have reviewed the note and personally performed a history and physical and agree with all the documentation and findings.

## 2022-04-29 NOTE — ED PROVIDER NOTES
Patient:   Sandra Woodson             MRN: [id number]            FIN: [id number]               Age:   48 years     Sex:  Female     :  1970   Associated Diagnoses:   Chest pain; HTN - Hypertension; Anemia   Author:   Kilo Kitchen MD      Basic Information   Time seen: Date & time 2019 22:42:00.   History source: Patient.   Arrival mode: Private vehicle, walking.   History limitation: None.   Additional information: Patient's physician(s): Juan Carlos ELIAS, Wang MARTINEZ.      History of Present Illness   The patient presents with high blood pressure, 47 YO WF with hx of HTN, CAD S/P stents followed by [facility] presents with intermittent left anterior chest  pain with SOB all day. BP very elevated this pm and took SL ntg. Valeria Horvath, NP SORT NOTE and     I, Dr. Kitchen, assumed care of this patient at 2305.    48 year old female with hx of HTN, hypothyroidism, Anemia, CAD presents to the ED stating for the past 2 weeks she has had left sided CP that radiates in her left armpit, left arm, left neck with throbbing HA today and nausea with blurred vision. Took BP meds at 1900 tonight and at 2100 took her BP and it was 193/124. Took 2NTG spaced out and called [facility]; was told to come to the ED.  Patient states that her cardiologist has been wanting to do another angiogram, but she has been too anemic with no sources found on colonoscopy and upper GI scope.  Takes 81mg ASA daily. Pt is on Plavix..  The onset was just prior to arrival.  The course/duration of symptoms is constant.  The current degree of hypertension systolic blood pressure: 178 mmHg diastolic blood pressure: 97 mmHg.  The maximum degree of hypertension is systolic blood pressure: 193 mmHg diastolic blood pressure: 124 mmHg.  Risk factors consist of hypertension.  Prior episodes: none.  Therapy today: none.  Associated symptoms: chest pain, headache, nausea and blurred vision.  Additional history: none.        Review of Systems   Constitutional  symptoms:  No fever, no chills.    Skin symptoms:  Negative except as documented in HPI.   Eye symptoms:  Blurred vision.   Respiratory symptoms:  No shortness of breath, no cough, no sputum production.    Cardiovascular symptoms:  Chest pain, left chest, anterior, moderate pain, radiating to the left arm, left armpit, left neck, No palpitations,    Gastrointestinal symptoms:  Nausea, no abdominal pain, no vomiting.    Neurologic symptoms:  Headache.   Psychiatric symptoms:  Negative except as documented in HPI.             Additional review of systems information: All other systems reviewed and otherwise negative.      Health Status   Allergies:    Allergic Reactions (Selected)  No Known Medication Allergies.   Medications:  (Selected)   Prescriptions  Prescribed  Lasix 40 mg oral tablet: 40 mg = 1 tab(s), Oral, Daily, # 30 tab(s), 0 Refill(s), Pharmacy: [facility], [place]  Plavix 75 mg oral tablet: 75 mg = 1 tab(s), Oral, Daily, # 30 tab(s), 0 Refill(s)  aspirin 81 mg oral tablet, CHEWABLE: 81 mg = 1 tab(s), Oral, Daily, # 30 tab(s), 0 Refill(s)  atorvastatin 40 mg oral tablet: 40 mg = 1 tab(s), Oral, Daily, # 30 tab(s), 0 Refill(s)  ferrous sulfate 325 mg (65 mg elemental iron) oral delayed release tablet: 325 mcmol/L = 1 tab(s), Oral, Daily, # 30 tab(s), 1 Refill(s)  omega-3 polyunsaturated fatty acids ethyl esters 1000 mg oral capsule: 2,000 mg = 2 cap(s), Oral, BID, # 120 cap(s), 0 Refill(s)  Documented Medications  Documented  Amoxil 875 mg oral tablet: 875 mg = 1 tab(s), Oral, BID  Promethazine (Plain) Syrup 6.25 mg / 5 mL:   Toprol-XL 25 mg oral tablet, extended release: 25 mg = 1 tab(s), Oral, Daily, 0 Refill(s)  Toradol 10 mg oral tablet: Oral, q6hr  acetaminophen-hydrocodone 325 mg-10 mg oral tablet: 1 tab(s), Oral, q6hr  alPRAzolam 1 mg oral tablet: 1 mg = 1 tab(s), Oral, TID  benzonatate 100 mg oral capsule: 100 mg = 1 cap(s), Oral, TID  cefdinir 300 mg oral capsule: 300 mg = 1 cap(s),  Oral, BID  cetirizine 10 mg oral tablet: 10 mg = 1 tab(s), Oral, Daily  codeine-promethazine 10 mg-6.25 mg/5 mL oral syrup:   escitalopram 10 mg oral tablet: 10 mg = 1 tab(s), Oral, Daily  ferrous sulfate 325 mg (65 mg elemental iron) oral tablet: 325 mg = 1 tab(s), Oral, Daily  furosemide 20 mg oral tablet: 20 mg = 1 tab(s), Oral, Daily  hydrOXYzine hydrochloride 25 mg oral tablet: 25 mg = 1 tab(s), Oral, q8hr, PRN PRN as needed for anxiety  hydrochlorothiazide-triamterene 25 mg-37.5 mg oral tablet: 1 tab(s), Oral, Daily  levothyroxine 50 mcg (0.05 mg) oral tablet: 50 mcg = 1 tab(s), Oral, qAM  lisinopril 10 mg oral tablet: 10 mg = 1 tab(s), Oral, Daily  metoprolol succinate 100 mg oral tablet, extended release: 100 mg = 1 tab(s), Oral, Daily  nitroglycerin 0.4 mg sublingual TAB:   ondansetron 8 mg oral tablet, disintegratin mg = 1 tab(s), Oral, q8hr  oseltamivir 75 mg oral capsule: 75 mg = 1 cap(s), Oral, BID  sertraline 50 mg oral tablet: 50 mg = 1 tab(s), Oral, Daily  tiZANidine 4 mg oral tablet: 4 mg = 1 tab(s), Oral, q8hr, PRN PRN muscle pain  zolpidem 12.5 mg oral tablet, extended release: 12.5 mg = 1 tab(s), Oral, Daily.   Immunizations: Per nurse's notes.   Menstrual history: Per nurse's notes.      Past Medical/ Family/ Social History   Medical history: HTN, CAD, HLD, Anemia, hypothyroidism.   Surgical history:    Colonoscopy (None) performed by VIRAJ Hernandez MD on 2019 at 48 Years.  Comments:  2019 14:22 CDT - Genoveva Welch LPN  auto-populated from documented surgical case  knee meniscus in  at 45 Years.  Knee meniscus (SNOMED CT 944395890) in  at 43 Years.  Oophorectomy (SNOMED CT 243214428) in  at 39 Years.  Cholecystectomy (SNOMED CT 31407607) in  at 35 Years.  H/O: hysterectomy (SNOMED CT 530168521) in  at 33 Years.  cardiac stent placement..   Family history:    Throat cancer  Father  Breast cancer  Mother  .   Social history:    Social & Psychosocial  Habits    Tobacco  05/27/2015  Use: Never smoker    03/12/2019  Use: Former smoker, quit more    Patient Wants Consult For Cessation Counseling N/A    04/03/2019  Use: Never (less than 100 in l    Patient Wants Consult For Cessation Counseling N/A    04/08/2019  Use: Former smoker, quit more    Patient Wants Consult For Cessation Counseling No    05/13/2019  Use: Former smoker, quit more    Patient Wants Consult For Cessation Counseling N/A    Comment: quit over 20 yrs ago - 05/13/2019 09:09 - Stan CONTRERAS, Bj Loaiza      Physical Examination               Vital Signs   Vital Signs   6/29/2019 23:09 CDT      Peripheral Pulse Rate     70 bpm                             Heart Rate Monitored      70 bpm                             Respiratory Rate          20 br/min                             SpO2                      99 %                             Systolic Blood Pressure   178 mmHg  HI                             Diastolic Blood Pressure  97 mmHg  HI                             Mean Arterial Pressure, Cuff              124 mmHg  .      No qualifying data available.   Measurements   6/29/2019 22:42 CDT      Weight Dosing             86 kg                             Weight Measured and Calculated in Lbs     189.60 lb                             Weight Estimated          86 kg                             Height/Length Dosing      165 cm                             Height/Length Estimated   165 cm                             Body Mass Index Estimated 31.59 kg/m2  .   Basic Oxygen Information   6/29/2019 23:09 CDT      SpO2                      99 %  .   General:  Alert, no acute distress.    Skin:  Warm, dry.    Head:  Normocephalic.   Neck:  Supple.   Eye:  Pupils are equal, round and reactive to light, extraocular movements are intact.    Cardiovascular:  Regular rate and rhythm, Normal peripheral perfusion.    Respiratory:  Lungs are clear to auscultation, respirations are non-labored.    Chest wall:  No tenderness.    Back:  Nontender, Normal range of motion.    Musculoskeletal:  Normal ROM.   Gastrointestinal:  Soft, Nontender.    Neurological:  Alert and oriented to person, place, time, and situation.   Psychiatric:  Cooperative, appropriate mood & affect.       Medical Decision Making   Documents reviewed:  Emergency department nurses' notes.   Electrocardiogram:  Time 6/29/2019 22:45:00, rate 69, normal sinus rhythm, No ST-T changes, no ectopy, normal VA & QRS intervals, EP Interp.    Results review:  Lab results : Lab View   6/29/2019 23:12 CDT      POC BNP iSTAT             70 pg/mL    6/29/2019 23:11 CDT      POC Troponin              0.00 ng/mL    6/29/2019 23:06 CDT      PT                        13.5 second(s)                             INR                       1.0                             PTT                       29.8 second(s)                             WBC                       6.7 x10(3)/mcL                             RBC                       3.89 x10(6)/mcL  LOW                             Hgb                       10.8 gm/dL  LOW                             Hct                       32.6 %  LOW                             Platelet                  224 x10(3)/mcL                             MCV                       83.8 fL                             MCH                       27.8 pg                             MCHC                      33.1 gm/dL                             RDW                       14.5 %                             MPV                       9.7 fL                             Abs Neut                  4.06 x10(3)/mcL                             Neutro Auto               61 %  NA                             Lymph Auto                32 %  NA                             Mono Auto                 7 %  NA                             Basophil Auto             1 %  NA                             Abs Neutro                4.06 x10(3)/mcL                             Abs Lymph                  2.1 x10(3)/mcL                             Abs Mono                  0.5 x10(3)/mcL                             Abs Baso                  0.0 x10(3)/mcL  ,    No qualifying data available.       Reexamination/ Reevaluation   Time: 6/29/2019 23:42:00 .   Vital signs   per nurse's notes   Course: improving.      Impression and Plan   Diagnosis   Chest pain (CGN60-SZ R07.9)   Anemia (GAJ82-FA D64.9)   HTN - Hypertension (PNED [id number])   Plan   Disposition: Admit time  6/29/2019 23:42:00, Place in Observation Telemetry Unit, Wang Rangel MD.    Follow up with   Counseled: Patient, Family, Regarding diagnosis, Regarding diagnostic results, Regarding treatment plan, Patient indicated understanding of instructions, Family at bedside understood.    Orders: I have personally seen and examined the patient and agree with the scribes documentation..    Notes: I, Atif Connell, acted solely as a scribe for and in the presence of Dr. Kitchen who performed the service..

## 2022-04-29 NOTE — CONSULTS
Patient:   Sandra Woodson             MRN: [id number]            FIN: [id number]               Age:   49 years     Sex:  Female     :  1970   Associated Diagnoses:   None   Author:   Tate Santos MD      SEE TODAY'S PN

## 2022-04-29 NOTE — CONSULTS
DATE OF CONSULTATION:  07/18/2019    ATTENDING PHYSICIAN:  Denzel Stewart MD  CONSULTING PHYSICIAN:  Claudette Clements MD    REASON FOR CONSULT:  Evaluate for CABG.    HISTORY OF PRESENT ILLNESS:  The patient is a 48-year-old female with a history of coronary artery disease presenting for evaluation with a cardiac cath after chest pain necessitating admission from 06/29/2019 to 07/01/2019.  Cardiac cath today performed revealed evidence of dual vessel coronary artery disease, and I was consulted for possible bypass.  She is currently with no pain.  She is very comfortable.    PAST MEDICAL HISTORY:  Positive for coronary artery disease, status post stenting, hypertension, anxiety, anemia, hyperlipidemia, depression, hypothyroidism, history of right knee surgery, cholecystectomy, hysterectomy, PTCA.    FAMILY HISTORY:  Positive for coronary artery disease in the father and breast cancer in her mother.    SOCIAL HISTORY:  Former smoker, quit in 1994.    ALLERGIES:  NKDA.    MEDICATIONS:  Per MAR.    REVIEW OF SYSTEMS:  Essentially positive for shortness of breath and occasional chest pain.    PHYSICAL EXAMINATION:  GENERAL:  She is currently in no acute distress.   VITAL SIGNS:  Stable.   NECK:  Trachea midline.  No carotid bruits.   EYES:  EOMI.  PERRLA.   LUNGS:  Good air entry bilaterally.   HEART:  Normal S1, S2.  No murmurs.   ABDOMEN:  Soft.   EXTREMITIES:  Warm.   PSYCH:  Affect is appropriate.   NEUROLOGIC:  2+ motor power.   MUSCULOSKELETAL:  No gross deformity.    ASSESSMENT/PLAN:  Proximal left anterior descending disease, in-stent restenosis with right coronary artery disease.  The patient will benefit from coronary artery bypass grafting.  I have explained for her the risks and the benefits of procedure including the risk of bleeding, infection, myocardial infarction, stroke, renal failure and death.  She completely understands and elected to proceed.  We will plan her surgery in the near  future.        ______________________________  MD ADE Mcdaniel/  DD:  07/18/2019  Time:  07:52PM  DT:  07/18/2019  Time:  08:03PM  Job #:  [id number]

## 2022-04-29 NOTE — ED PROVIDER NOTES
Patient:   Sandra Woodson             MRN: [id number]            FIN: [id number]               Age:   48 years     Sex:  Female     :  1970   Associated Diagnoses:   Chest pain   Author:   Navneet Clemons MD      Basic Information   Time seen: Date & time 2019 19:23:00.   History source: Patient.   Arrival mode: Ambulance.   History limitation: None.   Additional information: Chief Complaint from Nursing Triage Note : Chief Complaint   2019 19:00 CDT       Chief Complaint           pt presents to er c/o chest pain just PTA with SOB.  recently had stents placed.  just released from hospital yesterday for BP problems. pt is aaox4 anxious.  pt denies n/v/d.  .   Provider/Visit info:   Time Seen:  Navneet Clemons MD / 2019 19:16  .   History of Present Illness   The patient presents with     47 y/o CF with a history of CAD, hypothyroidism, and stents presents to ED with chest pain radiating to mid back and SIB PTA. Pt was discharged yesterday(19) from hospital after being treated for blood pressure issues. Pt states movement makes pain worse. .  The onset was just prior to arrival.  The course/duration of symptoms is constant.  Location: chest. Radiating pain: middle of the back. The character of symptoms is pain.  The degree at onset was moderate.  The degree at maximum was moderate.  The degree at present is moderate.  The exacerbating factor is movement.  The relieving factor is none.  Risk factors consist of coronary artery disease.  Prior episodes: none.  Therapy today EMS.  Associated symptoms: shortness of breath.        Review of Systems   Constitutional symptoms:  Negative except as documented in HPI.   Skin symptoms:  Negative except as documented in HPI.   Eye symptoms:  Negative except as documented in HPI.   ENMT symptoms:  Negative except as documented in HPI.   Respiratory symptoms:  Shortness of breath.   Cardiovascular symptoms:  Chest pain, radiating to the back.     Gastrointestinal symptoms:  Negative except as documented in HPI.   Musculoskeletal symptoms:  Negative except as documented in HPI.   Neurologic symptoms:  Negative except as documented in HPI.   Hematologic/Lymphatic symptoms:  Negative except as documented in HPI.             Additional review of systems information: All other systems reviewed and otherwise negative.      Health Status   Allergies: No known allergies.   Medications:  (Selected)   Prescriptions  Prescribed  Plavix 75 mg oral tablet: 75 mg = 1 tab(s), Oral, Daily, # 30 tab(s), 0 Refill(s)  aspirin 81 mg oral tablet, CHEWABLE: 81 mg = 1 tab(s), Oral, Daily, # 30 tab(s), 0 Refill(s)  atorvastatin 40 mg oral tablet: 40 mg = 1 tab(s), Oral, Daily, # 30 tab(s), 0 Refill(s)  ferrous sulfate 325 mg (65 mg elemental iron) oral delayed release tablet: 325 mcmol/L = 1 tab(s), Oral, Daily, # 30 tab(s), 1 Refill(s)  ketorolac 10 mg oral tablet: 10 mg = 1 tab(s), Oral, q6hr, PRN PRN pain, X 3 day(s), # 10 tab(s), 0 Refill(s)  omega-3 polyunsaturated fatty acids ethyl esters 1000 mg oral capsule: 2,000 mg = 2 cap(s), Oral, BID, # 120 cap(s), 0 Refill(s)  Documented Medications  Documented  Toprol-XL 25 mg oral tablet, extended release: 25 mg = 1 tab(s), Oral, Daily, 0 Refill(s)  alPRAzolam 1 mg oral tablet: 1 mg = 1 tab(s), Oral, TID  hydrOXYzine hydrochloride 25 mg oral tablet: 25 mg = 1 tab(s), Oral, q8hr, PRN PRN as needed for anxiety  levothyroxine 50 mcg (0.05 mg) oral tablet: 50 mcg = 1 tab(s), Oral, qAM  lisinopril 10 mg oral tablet: 10 mg = 1 tab(s), Oral, Daily  sertraline 50 mg oral tablet: 50 mg = 1 tab(s), Oral, Daily  tiZANidine 4 mg oral tablet: 4 mg = 1 tab(s), Oral, q8hr, PRN PRN muscle pain.      Past Medical/ Family/ Social History   Medical history:    No active or resolved past medical history items have been selected or recorded.,   CAD  hypothyroidism.   Surgical history:    knee meniscus in 2015 at 45 Years.  Knee meniscus (341491150)  in 2013 at 43 Years.  Oophorectomy (379051913) in 2009 at 39 Years.  Cholecystectomy (92792214) in 2005 at 35 Years.  H/O: hysterectomy ([id number]) in 2003 at 33 Years..   Family history:    Throat cancer  Father  Breast cancer  Mother  .   Social history: Tobacco use: Denies.      Physical Examination               Vital Signs   Vital Signs   4/7/2019 19:18 CDT       Peripheral Pulse Rate     77 bpm                             Respiratory Rate          33 br/min  HI                             SpO2                      100 %                             Oxygen Therapy            Room air                             Systolic Blood Pressure   165 mmHg  HI                             Diastolic Blood Pressure  81 mmHg                             Mean Arterial Pressure, Cuff              109 mmHg    4/7/2019 19:00 CDT       Temperature Oral          37.7 DegC                             Temperature Oral (calculated)             99.86 DegF                             Peripheral Pulse Rate     74 bpm                             Respiratory Rate          35 br/min  HI                             SpO2                      100 %                             Oxygen Therapy            Room air                             Systolic Blood Pressure   200 mmHg  HI                             Diastolic Blood Pressure  96 mmHg  HI    4/6/2019 16:00 CDT       Temperature Oral          36.9 DegC                             Temperature Oral (calculated)             98.42 DegF                             Peripheral Pulse Rate     72 bpm                             Respiratory Rate          20 br/min                             SpO2                      100 %                             Systolic Blood Pressure   165 mmHg  HI                             Diastolic Blood Pressure  95 mmHg  HI    4/6/2019 15:50 CDT       Temperature Oral          36.7 DegC                             Temperature Oral (calculated)             98.06 DegF                              Peripheral Pulse Rate     67 bpm                             Heart Rate Monitored      81 bpm                             Respiratory Rate          20 br/min                             SpO2                      100 %                             Oxygen Therapy            Room air                             Systolic Blood Pressure   167 mmHg  HI                             Diastolic Blood Pressure  92 mmHg  HI                             Mean Arterial Pressure, Cuff              117 mmHg    4/6/2019 14:34 CDT       Peripheral Pulse Rate     67 bpm                             Systolic Blood Pressure   167 mmHg  HI                             Diastolic Blood Pressure  92 mmHg  HI                             Mean Arterial Pressure, Cuff              117 mmHg    4/6/2019 11:40 CDT       Temperature Oral          36.7 DegC                             Temperature Oral (calculated)             98.06 DegF                             Peripheral Pulse Rate     67 bpm                             SpO2                      100 %                             Systolic Blood Pressure   160 mmHg  HI                             Diastolic Blood Pressure  86 mmHg                             Mean Arterial Pressure, Cuff              111 mmHg    4/6/2019 8:00 CDT        Heart Rate Monitored      70 bpm                             Oxygen Therapy            Room air    4/6/2019 7:29 CDT        Temperature Oral          36.3 DegC                             Temperature Oral (calculated)             97.34 DegF                             Peripheral Pulse Rate     70 bpm                             SpO2                      99 %                             Systolic Blood Pressure   100 mmHg                             Diastolic Blood Pressure  59 mmHg  LOW                             Mean Arterial Pressure, Cuff              73 mmHg  .   Measurements   4/7/2019 19:00 CDT       Weight Dosing             95.5 kg                              Weight Measured and Calculated in Lbs     210.54 lb                             Weight Estimated          95.5 kg                             Height/Length Dosing      165 cm                             Height/Length Estimated   165 cm                             Body Mass Index Estimated 35.08 kg/m2  .   Basic Oxygen Information   4/7/2019 19:18 CDT       SpO2                      100 %                             Oxygen Therapy            Room air    4/7/2019 19:00 CDT       SpO2                      100 %                             Oxygen Therapy            Room air    4/6/2019 16:00 CDT       SpO2                      100 %    4/6/2019 15:50 CDT       SpO2                      100 %                             Oxygen Therapy            Room air    4/6/2019 11:40 CDT       SpO2                      100 %    4/6/2019 8:00 CDT        Oxygen Therapy            Room air    4/6/2019 7:29 CDT        SpO2                      99 %  .   General:  Alert, anxious.    Skin:  Warm, dry.    Head:  Normocephalic, atraumatic.    Neck:  Supple, trachea midline, no JVD, no carotid bruit.    Eye:  Pupils are equal, round and reactive to light, extraocular movements are intact, normal conjunctiva, vision unchanged.    Ears, nose, mouth and throat:  Tympanic membranes clear, oral mucosa moist, no pharyngeal erythema or exudate.    Cardiovascular:  Regular rate and rhythm, No murmur, Normal peripheral perfusion.    Respiratory:  Lungs are clear to auscultation, breath sounds are equal, hyperventilating.    Chest wall:  tenderness to posterior sternum.   Back:  Normal range of motion, Thoracic: mid-thoracic paraspinal tenderness.    Musculoskeletal:  Normal ROM, normal strength, no tenderness, no swelling.    Gastrointestinal:  Soft, Nontender, Non distended, Normal bowel sounds.    Neurological:  Alert and oriented to person, place, time, and situation, No focal neurological deficit observed, CN II-XII intact, normal  sensory observed, normal motor observed, normal coordination observed, Speech: Normal.    Lymphatics:  No lymphadenopathy.   Psychiatric:  Cooperative, appropriate mood & affect.       Medical Decision Making   Documents reviewed:  Emergency department nurses' notes.   Electrocardiogram:  Time 4/7/2019 18:51:00, rate 75, normal sinus rhythm, No ST-T changes, no ectopy, EP Interp, low voltage.    Results review:  Lab results : Lab View   4/7/2019 20:11 CDT       POC Troponin              0.00 ng/mL    4/7/2019 20:06 CDT       Sodium Lvl                144 mmol/L                             Potassium Lvl             4.3 mmol/L                             Chloride                  111 mmol/L  HI                             CO2                       24.0 mmol/L                             Calcium Lvl               8.1 mg/dL  LOW                             Magnesium Lvl             1.8 mg/dL                             Glucose Lvl               95 mg/dL                             BUN                       30.0 mg/dL  HI                             Creatinine                1.43 mg/dL  HI                             eGFR-AA                   50 mL/min/1.73 m2  NA                             eGFR-TANI                  41 mL/min/1.73 m2  NA                             Bili Total                0.3 mg/dL                             Bili Direct               0.10 mg/dL                             Bili Indirect             0.20 mg/dL                             AST                       28 unit/L                             ALT                       34 unit/L                             Alk Phos                  72 unit/L                             Total Protein             5.8 gm/dL  LOW                             Albumin Lvl               3.10 gm/dL  LOW                             Globulin                  2.70 gm/dL                             A/G Ratio                 1.1 ratio                             PT                         13.8 second(s)                             INR                       1.0                             WBC                       6.3 x10(3)/mcL                             RBC                       3.76 x10(6)/mcL  LOW                             Hgb                       10.8 gm/dL  LOW                             Hct                       33.8 %  LOW                             Platelet                  188 x10(3)/mcL                             MCV                       89.9 fL                             MCH                       28.7 pg                             MCHC                      32.0 gm/dL  LOW                             RDW                       14.6 %                             MPV                       10.7 fL                             Abs Neut                  3.72 x10(3)/mcL                             Neutro Auto               60 %  NA                             Lymph Auto                34 %  NA                             Mono Auto                 5 %  NA                             Basophil Auto             1 %  NA                             Abs Neutro                3.72 x10(3)/mcL                             Abs Lymph                 2.2 x10(3)/mcL                             Abs Mono                  0.3 x10(3)/mcL                             Abs Baso                  0.0 x10(3)/mcL    4/6/2019 4:55 CDT        Sodium Lvl                145 mmol/L                             Potassium Lvl             4.2 mmol/L                             Chloride                  109 mmol/L  HI                             CO2                       29.0 mmol/L                             Calcium Lvl               7.8 mg/dL  LOW                             Glucose Lvl               189 mg/dL  HI                             BUN                       26.0 mg/dL  HI                             Creatinine                1.25 mg/dL  HI                             BUN/Creat Ratio           20.8   NA                             eGFR-AA                   58 mL/min/1.73 m2  NA                             eGFR-TANI                  48 mL/min/1.73 m2  NA                             WBC                       5.0 x10(3)/mcL                             RBC                       3.56 x10(6)/mcL  LOW                             Hgb                       10.1 gm/dL  LOW                             Hct                       32.3 %  LOW                             Platelet                  198 x10(3)/mcL                             MCV                       90.7 fL                             MCH                       28.4 pg                             MCHC                      31.3 gm/dL  LOW                             RDW                       14.6 %                             MPV                       10.4 fL  .   Radiology results:  Rad Results (ST)  < 12 hrs   Accession: [id number]  Order: CT Angio Chest W W/O Contrast  Report Dt/Tm: 04/07/2019 22:27  Report:      CTA chest with and without IV contrast     Clinical data: Chest pain     CT angiography of the chest was performed with and without IV  contrast. Three-dimensional and MIP images were obtained and  evaluated. Automatic exposure control was utilized.  DLP: 1333 mGycm     Findings:  There is no central pulmonary embolus. There is no aortic dissection.  There are minimal coronary and aortic calcifications. The great  vessels are normal. The heart is not enlarged. The upper abdominal  aorta is normal.     There is mild perihilar and lower lobe pulmonary interstitial edema.  There is a small right pleural effusion. There is no lobar  consolidation. There is no pneumothorax. The upper abdomen  demonstrates no acute abnormality..     Impression:  1. No evidence of aortic dissection.  2. Perihilar and lower lobe interstitial pulmonary edema with a small  right pleural effusion.    .      Reexamination/ Reevaluation   Vital signs   Basic Oxygen Information    4/7/2019 19:18 CDT       SpO2                      100 %                             Oxygen Therapy            Room air    4/7/2019 19:00 CDT       SpO2                      100 %                             Oxygen Therapy            Room air    4/6/2019 16:00 CDT       SpO2                      100 %    4/6/2019 15:50 CDT       SpO2                      100 %                             Oxygen Therapy            Room air    4/6/2019 11:40 CDT       SpO2                      100 %    4/6/2019 8:00 CDT        Oxygen Therapy            Room air    4/6/2019 7:29 CDT        SpO2                      99 %     Patient's pain better in the ED but still uncomfortable.  Workup is as above.  Have discussed with hospitalist will admit      Impression and Plan   Diagnosis   Chest pain ([id number] R07.9)   Plan   Condition: Stable.    Disposition: Admit time  4/7/2019 23:07:00, Place in Observation Telemetry Unit.    Counseled: Patient, Regarding diagnosis, Regarding diagnostic results, Regarding treatment plan, Regarding prescription, Patient indicated understanding of instructions.    Notes: I, Isatu Newsome, acted solely as a scribe for and in the presence of  who performed the service., I  personally performed all of the above services as recorded in this chart.

## 2022-04-29 NOTE — CONSULTS
"   Patient:   Sandra Woodson             MRN: [id number]            FIN: [id number]               Age:   48 years     Sex:  Female     :  1970   Associated Diagnoses:   None   Author:   Eyad ELIAS, Severiano ANDERSON      Basic Information   Visit type:    CONSULTATION H&P  DATE OF ADMISSION:   DATE OF CONSULT:   REASON FOR CONSULTATION:.    Source of history:  Self.    Present at bedside:  Family member, Medical personnel.    Referral source:  consult from [facility].    History limitation:  None.    Provider information/ cc:    PCP: [facility]  ADVANCED DIRECTIVES: NONE  CODE STATUS: FULL.       Chief Complaint   2019 22:42 CDT      pT. c/O ELEVATED BP  STARTED TONIGHT AND SOB ALL DAY TODAY WITH LEFT SIDED CHEST PAIN AND PRESSURE IN HEAD.. SEES CIS.. TOOK 2 SUBLINGUAL NITRO PRIOR TO COMING.. HX OF STENTS 11 YEARS AGO      History of Present Illness   48-year-old   female admitted to the services of cardiology for reports of CP, SOB, and elevated blood pressure. PMH includes CAD, MI, hyperlipidemia, hypertension, anxiety, depression, and hypothyroidism. PT reported taking SL nitro. She had C in March which she had  PCI to LCx with TAMY.  PT reports injuring her left foot about 2 weeks ago, at that time she went to urgent care and was diagnosed with fracture which she was referred to ortho services. She reports going to ortho and fracture was confirmed and placed in a boot. Pt reports she was to keep th boot on for 6 weeks. She report staking the boot off 3 days ago to go swimming and slipped trying to get into the inground pool at her home. She report she has been wearing the boot since then. She reports pain to left foot and was wanting the foot to be "checked out" while she was here. Hospital medicine services have been consulted for evaluation of left foot pain.   VS /81 P 69 R 18 T 36.9C      Review of Systems   Except as document, all other systems reviewed and negative    "   Health Status   Allergies:    Allergic Reactions (Selected)  No Known Medication Allergies   Current medications:  (Selected)   Inpatient Medications  Ordered  Benadryl: 25 mg, form: Cap, Oral, Once a day (at bedtime) PRN for insomnia, first dose 06/30/19 0:51:00 CDT  Benadryl: 25 mg, form: Cap, Oral, q4hr PRN for itching, first dose 06/30/19 0:51:00 CDT  Maalox Antacid with Anti-Gas: 30 mL, form: Susp, Oral, q4hr PRN for dyspepsia, first dose 06/30/19 0:51:00 CDT  Milk of Magnesia: 30 mL, form: Susp, Oral, Daily PRN for constipation, first dose 06/30/19 0:51:00 CDT  Nitro-Bid 2% transdermal oint: 1 in, form: Ointment, TOP, [id number] (6-12-6-12), first dose 06/29/19 23:09:00 CDT  Norco 5 mg-325 mg oral tablet: 1 tab(s), form: Tab, Oral, q4hr PRN for pain, moderate, first dose 06/30/19 0:51:00 CDT  Norco 5 mg-325 mg oral tablet: 2 tab(s), form: Tab, Oral, q4hr PRN for pain, severe, first dose 06/30/19 0:51:00 CDT  Plavix 75 mg oral tablet: 75 mg, form: Tab, Oral, Daily, first dose 06/30/19 9:00:00 CDT  Senokot S: 1 tab(s), form: Tab, Oral, BID PRN for constipation, first dose 06/30/19 0:51:00 CDT, choose only if unrelieved by Milk of Magnesia or choose first if ordered alone  Toprol-XL 25 mg oral tablet, extended release: 25 mg, form: Tab XL, Oral, Daily, first dose 06/30/19 9:00:00 CDT  Tylenol: 1,000 mg, form: Tab, Oral, q6hr PRN for pain, mild, first dose 06/30/19 0:51:00 CDT, or fever; No more than 4 grams in 24 hours  Tylenol: 650 mg, form: Supp, [place], q6hr PRN for pain, first dose 06/30/19 0:51:00 CDT, mild or fever if patient unable to swallow; No more than 4 grams in 24 hours  Xylocaine Jelly 2% topical gel with applicator: 5 mL, form: Gel, TOP, Once PRN for other (see comment), first dose 06/30/19 0:51:00 CDT, for insertion of urinary catheter in males  Zofran ODT: 8 mg, form: Tab-Dis, Oral, q8hr PRN for nausea/vomiting, first dose 06/30/19 8:00:00 CDT  alPRAzolam 0.5 mg oral tablet: 1 mg, form: Tab, Oral,  TID, first dose 06/30/19 14:00:00 CDT  aspirin 81 mg oral tablet, CHEWABLE: 81 mg, form: Tab-Chew, Oral, Daily, first dose 06/30/19 9:00:00 CDT  atorvastatin 40 mg oral tablet: 40 mg, form: Tab, Oral, Daily, first dose 06/30/19 17:00:00 CDT  ferrous sulfate: 325 mg, form: Tab, Oral, Daily, first dose 06/30/19 9:00:00 CDT  hydrALAZINE: 10 mg, form: Injection, IV Push, q2hr PRN for hypertension, first dose 06/30/19 0:51:00 CDT, STAT, SBP > 160mmHg or DBP > 100 mmHg, if HR < 60 or when BP does not respond to Labetolol  labetalol: 10 mg, form: Soln, IV Push, q1hr PRN for hypertension, first dose 06/30/19 0:51:00 CDT, STAT, SBP > 160mmHg or DBP > 110 mmHg, may repeat hourly as necessary if HR > 60  levothyroxine 50 mcg (0.05 mg) oral tablet: 50 mcg, form: Tab, Oral, qAM, first dose 07/01/19 6:00:00 CDT  losartan: 50 mg, form: Tab, Oral, Daily, first dose 07/01/19 9:00:00 CDT  nitroglycerin: 0.4 mg, form: Tab-SL, SL, q5min PRN for chest pain, Order duration: 3 dose(s), first dose 06/30/19 0:51:00 CDT, stop date Limited # of times, if systolic BP > 100 until pain relieved of at level 1  sertraline 50 mg oral tablet: 50 mg, form: Tab, Oral, Daily, first dose 06/30/19 9:00:00 CDT  tiZANidine: 4 mg, form: Tab, Oral, q8hr PRN for muscle pain, first dose 06/30/19 7:38:00 CDT  zolpidem 10 mg oral tablet: 10 mg, form: Tab, Oral, At Bedtime, first dose 06/30/19 21:00:00 CDT  Prescriptions  Prescribed  Plavix 75 mg oral tablet: 75 mg = 1 tab(s), Oral, Daily, # 30 tab(s), 0 Refill(s)  aspirin 81 mg oral tablet, CHEWABLE: 81 mg = 1 tab(s), Oral, Daily, # 30 tab(s), 0 Refill(s)  atorvastatin 40 mg oral tablet: 40 mg = 1 tab(s), Oral, Daily, # 30 tab(s), 0 Refill(s)  omega-3 polyunsaturated fatty acids ethyl esters 1000 mg oral capsule: 2,000 mg = 2 cap(s), Oral, BID, # 120 cap(s), 0 Refill(s)  Documented Medications  Documented  Amoxil 875 mg oral tablet: 875 mg = 1 tab(s), Oral, BID  Toprol-XL 25 mg oral tablet, extended release: 25  mg = 1 tab(s), Oral, Daily, 0 Refill(s)  Toradol 10 mg oral tablet: 10 mg = 1 tab(s), Oral, q6hr  acetaminophen-hydrocodone 325 mg-10 mg oral tablet: 1 tab(s), Oral, q6hr  alPRAzolam 1 mg oral tablet: 1 mg = 1 tab(s), Oral, TID  codeine-promethazine 10 mg-6.25 mg/5 mL oral syrup: 5 mL, Oral, q6hr  escitalopram 10 mg oral tablet: 10 mg = 1 tab(s), Oral, Daily  ferrous sulfate 325 mg (65 mg elemental iron) oral tablet: 325 mg = 1 tab(s), Oral, Daily  furosemide 20 mg oral tablet: 20 mg = 1 tab(s), Oral, Daily  furosemide 40 mg oral tablet: 40 mg = 1 tab(s), Oral, Daily  hydrOXYzine hydrochloride 25 mg oral tablet: 25 mg = 1 tab(s), Oral, q8hr, PRN PRN as needed for anxiety  hydrochlorothiazide-triamterene 25 mg-37.5 mg oral tablet: 1 tab(s), Oral, Daily  levothyroxine 50 mcg (0.05 mg) oral tablet: 50 mcg = 1 tab(s), Oral, qAM  lisinopril 10 mg oral tablet: 10 mg = 1 tab(s), Oral, Daily  meloxicam 7.5 mg oral tablet: 7.5 mg = 1 tab(s), Oral, BID  nitroglycerin 0.4 mg sublingual TAB:   ondansetron 8 mg oral tablet, disintegratin mg = 1 tab(s), Oral, q8hr  sertraline 50 mg oral tablet: 50 mg = 1 tab(s), Oral, Daily  tiZANidine 4 mg oral tablet: 4 mg = 1 tab(s), Oral, q8hr, PRN PRN muscle pain  traMADol 50 mg oral tablet: 50 mg = 1 tab(s), Oral, q4-6hr  zolpidem 12.5 mg oral tablet, extended release: 12.5 mg = 1 tab(s), Oral, Daily      Histories   PMH: CAD, MI, HLD, HTN, Hypothyroidism, Anxiety, and Depression  PSH:  Right knee surgery, cholecystectomy, hysterectomy, PTCA, colonoscopy, angiogram  Family Hx:  Father - Throat Cancer; Mother- Breast Cancer  Social Hx:  Former smoker.  Denies alcohol use.  Denies illicit drug use.         Physical Examination      Vital Signs (last 24 hrs)_____  Last Charted___________  Temp Oral     36.9 DegC  ( 11:00)  Heart Rate Peripheral   69 bpm  ( 11:00)  Resp Rate         19 br/min  ( 00:17)  SBP      H 141mmHg  ( 11:00)  DBP      81 mmHg  (  11:00)  SpO2      100 %  (JUN 30 11:00)  Weight      91.1 kg  (JUN 30 01:00)  Height      165 cm  (JUN 30 00:59)     General:  No acute distress, in bed resting, arouses to verbal and tactile stimuli, family at bedside.    Eye:  Pupils are equal, round and reactive to light, Extraocular movements are intact, Normal conjunctiva, Vision unchanged.    HENT:  Normocephalic, Oral mucosa is moist, No pharyngeal erythema.    Neck:  Supple, Non-tender, No jugular venous distention.    Respiratory:  Lungs are clear to auscultation, Respirations are non-labored, Breath sounds are equal, Symmetrical chest wall expansion.    Cardiovascular:  Normal rate, Regular rhythm, S1, S2, No gallop, Normal peripheral perfusion.    Gastrointestinal:  Soft, Non-tender, Non-distended, Normal bowel sounds.    Genitourinary:  No costovertebral angle tenderness.    Musculoskeletal:  orthotic boot noted to left foot, CSM intact, TTP  moves other extremeties freely and on command, generalized weakness.    Integumentary:  Warm, Dry, TTP over left ankle.    Neurologic:  Alert, Oriented, Normal sensory, Normal motor function, No focal deficits.    Psychiatric:  Cooperative, Appropriate mood & affect.    Cognition and Speech:  Speech clear and coherent.       Review / Management   Results review:     Labs (Last four charted values)  WBC                  6.7 (JUN 29)   Hgb                  L 10.8 (JUN 29)   Hct                  L 32.6 (JUN 29)   Plt                  224 (JUN 29)   Na                   140 (JUN 29)   K                    L 3.2 (JUN 30) 3.5 (JUN 29)   CO2                  28.0 (JUN 29)   Cl                   107 (JUN 29)   Cr                   H 1.21 (JUN 29)   BUN                  H 22.0 (JUN 29)   Glucose Random       H 140 (JUN 29)   PT                   13.5 (JUN 29)   INR                  1.0 (JUN 29)   PTT                  29.8 (JUN 29) .    Laboratory Results   Today's Lab Results : PowerNote Discrete Results   6/30/2019 12:48  CDT      Total CK                  39 unit/L                             CK MB                     0.7 ng/mL                             Troponin-I                <0.02 ng/mL    6/30/2019 6:43 CDT       Total CK                  34 unit/L                             CK MB                     <0.5 ng/mL                             Troponin-I                <0.02 ng/mL    6/30/2019 2:35 CDT       Potassium Lvl             3.2 mmol/L  LOW                             Magnesium Lvl             1.7 mg/dL  LOW    6/29/2019 23:12 CDT      POC BNP iSTAT             70 pg/mL    6/29/2019 23:11 CDT      POC Troponin              0.00 ng/mL    6/29/2019 23:06 CDT      WBC                       6.7 x10(3)/mcL                             RBC                       3.89 x10(6)/mcL  LOW                             Hgb                       10.8 gm/dL  LOW                             Hct                       32.6 %  LOW                             Platelet                  224 x10(3)/mcL                             MCV                       83.8 fL                             MCH                       27.8 pg                             MCHC                      33.1 gm/dL                             RDW                       14.5 %                             MPV                       9.7 fL                             Abs Neut                  4.06 x10(3)/mcL                             Neutro Auto               61 %  NA                             Lymph Auto                32 %  NA                             Mono Auto                 7 %  NA                             Basophil Auto             1 %  NA                             Abs Neutro                4.06 x10(3)/mcL                             Abs Lymph                 2.1 x10(3)/mcL                             Abs Mono                  0.5 x10(3)/mcL                             Abs Baso                  0.0 x10(3)/mcL                             PT                         13.5 second(s)                             INR                       1.0                             PTT                       29.8 second(s)                             Sodium Lvl                140 mmol/L                             Potassium Lvl             3.5 mmol/L                             Chloride                  107 mmol/L                             CO2                       28.0 mmol/L                             Calcium Lvl               8.4 mg/dL  LOW                             Glucose Lvl               140 mg/dL  HI                             BUN                       22.0 mg/dL  HI                             Creatinine                1.21 mg/dL  HI                             eGFR-AA                   >60 mL/min/1.73 m2  NA                             eGFR-TANI                  50 mL/min/1.73 m2  NA                             Bili Total                0.4 mg/dL                             Bili Direct               0.10 mg/dL                             Bili Indirect             0.30 mg/dL                             AST                       26 unit/L                             ALT                       49 unit/L                             Alk Phos                  112 unit/L                             Total Protein             7.7 gm/dL                             Albumin Lvl               4.10 gm/dL                             Globulin                  3.60 gm/dL  HI                             A/G Ratio                 1.1 ratio                             Troponin-I                <0.02 ng/mL  LOW        Radiology results   Reviewed radiologist's report,   XRAY Left foot  Reason For Exam  Pain    Radiology Report  EXAMINATION  XR Foot Left Minimum 3 Views     INDICATION  Pain after recent fall     Comparison: None available     FINDINGS  There is suspected subtle, nondisplaced fracture through the inferior  aspect of the lateral malleolus, only visualized on the frontal  projection. No  other compelling evidence of acute osseous disruption  is identified. Joint spacing and alignment are preserved.     Regional soft tissue swelling is noted.     IMPRESSION  1.  Suspected nondisplaced fracture of the lateral malleolus inferior  aspect, appreciated only a single view.  2.  No other convincing acute osseous disruption is identified.       Signature Line  Electronically Signed By: Flo Franklin MD  Date/Time Signed: 06/30/2019 14:32      Documentation reviewed:  Records from referring physician, Reviewed prior records, Reviewed home medications.    Condition:  Fair.       Impression and Plan   Diagnosis       IMPRESSION:  Lateral Malleolus Inferior Fracture- closed  Left foot pain  Chest pain  CAD- s/p stent placement  HTN- essential  HLD    PLAN  Xray Left foot. PRN pain management. Keep boot on to left foot as directed per ortho services. PT has been evaluated by ortho services out patient. Xray done revealed non-displaced fracture of malleolus. PT will need to ortho services for further management. Nothing further to add from a medical standpoint. Please do not hesitate to contact us if we can be of further assisance    FULL CODE STATUS  I, Tg Ramon APRN, FNP, discussed the case with Dr. Severiano Castano.   .     Orders     Orders   Consults:  Consult to Orthopedic Surgery Service On Call (Order): 6/30/2019 15:23 CDT, left lateral malleolus fx, Karl ELIAS, Mateo ABDI     Education and Follow-up:       Counseled: Patient, Family, Regarding diagnosis, Regarding treatment, Regarding medications.       Professional Services   Patient seen and examined personally by me.  Case discussed with NP.  48-year-old female with hypertension, CAD, HLD, anxiety/depression admitted for complaints of chest pain elevated blood pressure.  Additionally, patient complained of increased left foot/ankle pain after attempting to go swimming yesterday.  Patient was diagnosed with left malleolar fracture  previously and was supposed to wear walking boot for 6 weeks. IT has been approximately 2 weeks the patient has been wearing the boot.  Plain x-ray done here showed evidence of left malleolar fracture.  Orthopedic surgery consult.  Patient has vague left-sided chest pain at present.  This is being worked up by cardiology. PE - Gen - NAD CV - RRR Pulm - CTA B MS - boot to left foot/ankle.  Continue current medications, pain control consult orthopedic surgery.  Further recommendations for chest pain blood pressure per cardiology.  Agree with exam/plan as documented by NP.

## 2022-04-29 NOTE — OP NOTE
Patient:   Sandra Woodson             MRN: [id number]            FIN: [id number]               Age:   49 years     Sex:  Female     :  1970   Associated Diagnoses:   None   Author:   Neelima XIONG MD, Madi MILLIGAN      SURGEON: Madi Ortez MD    PREOPERATIVE DIAGNOSIS: Left shoulder adhesive capsulitis, partial rotator cuff tear, SLAP tear with biceps tendinitis  POSTOPERATIVE DIAGNOSIS: Left shoulder adhesive capsulitis, partial rotator cuff tear, SLAP tear with biceps tendinitis, impingement    PROCEDURE PERFORMED:   1. Left shoulder arthroscopic lysis of adhesions, manipulation under anesthesia  2. Left shoulder arthroscopic rotator cuff debridement  3. Left shoulder arthroscopic biceps tenotomy  4. Left shoulder arthroscopic subacromial decompression    ASSISTANT: KAYE Knight    ANESTHESIA: General plus regional    ESTIMATED BLOOD LOSS: Minimal.    COMPLICATIONS: None.    IMPLANTS:    None    INDICATIONS FOR PROCEDURE: Sandra is a 49y.o. female who has had ongoing left shoulder pain and weakness and restriction of movement.. The patient was seen in the clinic and preoperative imaging has revealed a torn rotator cuff but clinically she had continue to see adhesive capsulitis.. The patient has failed nonoperative treatment and has elected for operative intervention. Risks and benefits were thoroughly explained and consent was obtained prior to today's procedure.    DESCRIPTION OF PROCEDURE: The patient was seen in the preoperative holding area where the history and physical were reviewed without change. The operative shoulder was marked, consents were reviewed, and any questions were answered for the patient. A regional blockade of the shoulder was performed by the Anesthesia Service. The patient was induced under general anesthesia. Next the patient was placed upright into the beachchair position with care taken to ensure the cervical spine was well positioned and the face, eyes and all bony  prominences well protected. Preoperative time-out led by the surgeon was performed verifying the patient, procedure, and preoperative antibiotics. The left upper extremity was then prepped and draped in the usual sterile fashion and secured to an arm davis.    A standard posterior portal was established with a #11-blade.  The arthroscope was inserted into the glenohumeral joint atraumatically. The joint was insufflated with arthroscopic fluid. We then made a standard anterior portal using an #18-gauge spinal needle for guidance. An arthroscopic probe was inserted through the anterior portal and diagnostic arthroscopy begun.    This revealed a inflamed biceps tendon with an unstable anchor. A torn superior labrum. An intact and inflamed anterior, inferior and posterior labrum.  The surrounding capsule was also inflamed.  This was released with an RF device and a shaver.  The articular cartilage of the humeral head was intact. The articular cartilage of the glenoid was intact. The subscapularis tendon was intact. The articular side of the supraspinatus tendon was partially torn less than 50%.  Shaver was used in order to debride this down to a stable rim.. The articular side of the infraspinatus tendon was intact.    We then used our arthroscopic punch to perform a tenotomy of the biceps tendon. We used arthroscopic shaver to debride any remnant biceps from the superior labrum. The biceps tendon was then allowed to retract to the bicipital groove anteriorly.    We identified the undersurface of the acromion. We used a VAPR to debride the undersurface of the acromion up to the anterior and lateral margins. We identified the CA ligament and reflected it off the acromion. We continued debridement of the bursal tissue.  The underside of the acromion was causing mass-effect on the rotator cuff.    Next, we did our acromioplasty by burring the anterolateral margin of the acromion then working carefully medially. The  portals were switched and the acromioplasty was complated through the posterior portal in the cutting block fashion.     Following this I used gentle manipulation order to achieve a full range of motion of the shoulder in forward flexion abduction and external rotation.    Once we completed this, we took the remaining final arthroscopic pictures. We then removed our instruments, closed the portals with #3-0 monocryl suture. Sterile dressings were applied. The patient was then placed in an abduction pillow and sling, awoken from general anesthetic, and taken to recovery room in a stable condition.

## 2022-04-29 NOTE — ED PROVIDER NOTES
Patient:   Sandra Woodson             MRN: [id number]            FIN: [id number]               Age:   48 years     Sex:  Female     :  1970   Associated Diagnoses:   Hypertension; Edema; Chest pain in adult; Coronary artery disease   Author:   Deejay ELIAS, John SANDERS      Basic Information   Time seen: Date & time 2019 12:35:00.   History source: Patient.   Arrival mode: Private vehicle.   History limitation: None.   Additional information: Patient's physician(s): Juan Carlos ELIAS, Wang MARTINEZ, Tyree ELIAS, Raymond Figueroa MD , Keo Peterson, I have assummed care of this patient at           . [facility].   Provider/Visit info:    No qualifying data available.   .   History of Present Illness   The patient presents with 49y/o F presents to the ED with SOB onset Monday dx with PNA. On yesterday she begin to have chest pain. States today begin to feel dizzy with a  noted BP > 200's. C.Mariel FNP-C and   48 year old white female with history of HTN and CAD presents to ED c/o dyspnea for x1 day. Patient states she felt bad last night and BP was 190/100. She states she checked BP again this morning and was 211/112. Patient reports swelling in fingers and bilat LE, n/v when coughing, and lower back pain when she attempted to drink water. Patient denies dysuria. Patient discharged . .  The onset was 1  days ago.  The course/duration of symptoms is constant and fluctuating in intensity.  Degree at onset moderate.  Degree at present moderate.  The Exacerbating factors is none.  The Relieving factors is none.  Risk factors consist of coronary artery disease and hypertension.  Prior episodes: none.  Therapy today: none.  Associated symptoms: cough, nausea, vomiting and back pain.  Additional history: none.        Review of Systems   Constitutional symptoms:  Negative except as documented in HPI.   Skin symptoms:  Negative except as documented in HPI.   Eye symptoms:  Negative except as documented in HPI.   ENMT  symptoms:  Negative except as documented in HPI.   Respiratory symptoms:  Shortness of breath, cough.    Cardiovascular symptoms:  Peripheral edema, swelling in fingers.    Gastrointestinal symptoms:  Nausea, vomiting.    Genitourinary symptoms:  No dysuria,    Musculoskeletal symptoms:  Back pain (when drinking water).   Neurologic symptoms:  Negative except as documented in HPI.   Psychiatric symptoms:  Negative except as documented in HPI.   Endocrine symptoms:  Negative except as documented in HPI.   Hematologic/Lymphatic symptoms:  Negative except as documented in HPI.   Allergy/immunologic symptoms:  Negative except as documented in HPI.             Additional review of systems information: All other systems reviewed and otherwise negative.      Health Status   Allergies: No known allergies.   Medications:  (Selected)   Prescriptions  Prescribed  Levaquin 500 mg oral tablet: 500 mg = 1 tab(s), Oral, q24hr, X 7 day(s), # 7 tab(s), 0 Refill(s)  aspirin 81 mg oral tablet, CHEWABLE: 81 mg = 1 tab(s), Oral, Daily, # 30 tab(s), 0 Refill(s)  atorvastatin 40 mg oral tablet: 40 mg = 1 tab(s), Oral, Daily, # 30 tab(s), 0 Refill(s)  magnesium gluconate 500 mg oral tablet: 500 mg = 1 tab(s), Oral, BID, # 20 tab(s), 0 Refill(s), Pharmacy: [id number]  prednisONE 20 mg oral tablet: 20 mg = 1 tab(s), Oral, Daily, X 5 day(s), # 5 tab(s), 0 Refill(s)  Documented Medications  Documented  Plavix 75 mg oral tablet: 75 mg = 1 tab(s), Oral, Daily, 0 Refill(s)  Toprol-XL 25 mg oral tablet, extended release: 25 mg = 1 tab(s), Oral, BID, 0 Refill(s)  alPRAzolam 1 mg oral tablet: 1 mg = 1 tab(s), Oral, q8hr, PRN PRN anxiety  levothyroxine 50 mcg (0.05 mg) oral tablet: 50 mcg = 1 tab(s), Oral, qAM  sertraline 50 mg oral tablet: 50 mg = 1 tab(s), Oral, Daily  tiZANidine 4 mg oral tablet: 4 mg = 1 tab(s), Oral, q8hr, PRN PRN muscle pain.   Immunizations: no tetanus up to date, no influenza, no pneumococcal.   Menstrual  history: Hysterectomy.      Past Medical/ Family/ Social History   Medical history:    Resolved  Asthma ([id number]):  Resolved.  Comments:  7/31/2019 CDT 15:21 CDT - Juanito CONTRERAS, [facility]  as child  Kidney infection ([id number]):  Resolved..   Surgical history:    Bypass CABG (.) on 8/1/2019 at 48 Years.  Comments:  8/1/2019 10:48 CDT - Bishop CONTRERAS, N[facility] DIA  auto-populated from documented surgical case  Colonoscopy (None) on 5/13/2019 at 48 Years.  Comments:  5/14/2019 14:22 CDT - Genoveva Welch LPN  auto-populated from documented surgical case  knee meniscus in 2015 at 45 Years.  Knee meniscus (805430856) in 2013 at 43 Years.  Oophorectomy (222039502) in 2009 at 39 Years.  Cholecystectomy (32241425) in 2005 at 35 Years.  H/O: hysterectomy (834909792) in 2003 at 33 Years.  cardiac stent placement.  Esophagogastroduodenoscopy (190566425).  Extraction of wisdom tooth (060914587).  EA - Endometrial ablation (624889514).  TL - Tubal ligation (049174787)..   Family history:    Father  Throat cancer  Mother  Breast cancer  .   Social history: Alcohol use: Denies, Tobacco use: Quit 24 years ago, Drug use: Denies, Occupation: Employed, Family/social situation: ; lives with sons.      Physical Examination               Vital Signs   Vital Signs   9/12/2019 12:33 CDT      Temperature Oral          37.4 DegC                             Temperature Oral (calculated)             99.32 DegF                             Peripheral Pulse Rate     73 bpm                             Respiratory Rate          18 br/min                             SpO2                      98 %                             Systolic Blood Pressure   208 mmHg  HI                             Diastolic Blood Pressure  101 mmHg  HI  .   Measurements   9/12/2019 12:33 CDT      Weight Dosing             81.5 kg                             Weight Measured and Calculated in Lbs     179.67 lb                             Weight Estimated          81.5 kg                              Height/Length Dosing      165.10 cm                             Height/Length Estimated   165.10 cm                             Body Mass Index Estimated 29.9 kg/m2  .   Basic Oxygen Information   9/12/2019 12:33 CDT      SpO2                      98 %  .   General:  Alert, no acute distress,   white female, not anxious, not ill-appearing.    Skin:  Warm, dry, no rash, normal for ethnicity.    Head:  Normocephalic, atraumatic.    Neck:  Supple, trachea midline, no tenderness, no JVD, no carotid bruit.    Eye:  Pupils are equal, round and reactive to light, extraocular movements are intact, normal conjunctiva.    Ears, nose, mouth and throat:  Tympanic membranes clear, oral mucosa moist, no pharyngeal erythema or exudate.    Cardiovascular:  Regular rate and rhythm, No murmur, Normal peripheral perfusion, No edema.    Respiratory:  Lungs are clear to auscultation, respirations are non-labored, breath sounds are equal.    Chest wall:  No tenderness, No deformity.    Back:  Nontender, Normal range of motion, Normal alignment, no step-offs.    Musculoskeletal:  Normal ROM, normal strength, no tenderness, no swelling, no deformity.    Gastrointestinal:  Soft, Nontender, Non distended, Normal bowel sounds, No organomegaly,   post-tussive emesis.    Genitourinary:  no CVA tenderness.   Neurological:  Alert and oriented to person, place, time, and situation, No focal neurological deficit observed, CN II-XII intact, normal sensory observed, normal motor observed, normal speech observed, normal coordination observed.    Lymphatics:  No lymphadenopathy.   Psychiatric:  Cooperative, appropriate mood & affect, normal judgment, non-suicidal.       Medical Decision Making   Differential Diagnosis:  Pneumonia, bronchitis, congestive heart failure, chronic obstructive pulmonary disease, anxiety, Hypertension.    Documents reviewed:  Emergency department nurses' notes.   Orders  Launch Orders    Laboratory:  TSH (Order): Stat collect, 9/12/2019 12:37 CDT, Blood, Lab Collect, Print Label By Order Location, 9/12/2019 12:37 CDT  UA Total a reflex to culture (Order): Stat collect, Urine, 9/12/2019 12:37 CDT, Nurse collect, Print Label By Order Location  Magnesium Level (Order): Stat collect, 9/12/2019 12:37 CDT, Blood, Lab Collect, Print Label By Order Location, 9/12/2019 12:37 CDT  Phosphorus Level (Order): Stat collect, 9/12/2019 12:37 CDT, Blood, Lab Collect, Print Label By Order Location, 9/12/2019 12:37 CDT  PT (Order): Stat collect, 9/12/2019 12:37 CDT, Blood, Lab Collect, Print Label By Order Location, 9/12/2019 12:37 CDT  PTT (Order): Stat collect, 9/12/2019 12:37 CDT, Blood, Lab Collect, Print Label By Order Location, 9/12/2019 12:37 CDT  POC iSTAT ER Troponin request (Order): Blood, Stat collect, 9/12/2019 12:37 CDT by Mariel FNP-CEmily, Lab Collect, Print Label By Order Location  POC BNP iSTAT request (Order): Blood, Routine collect, 9/12/2019 12:37 CDT by Mariel FNP-C, Emily RECINOS, Lab Collect, Print Label By Order Location  BNP (Order): Stat collect, 9/12/2019 12:37 CDT, Blood, Lab Collect, Print Label By Order Location, 9/12/2019 12:37 CDT  Troponin-I (Order): Stat collect, 9/12/2019 12:37 CDT, Blood, Lab Collect, Print Label By Order Location, 9/12/2019 12:37 CDT  CMP (Order): Stat collect, 9/12/2019 12:37 CDT, Blood, Lab Collect, Print Label By Order Location, 9/12/2019 12:37 CDT  CBC w/ Auto Diff (Order): Now collect, 9/12/2019 12:37 CDT, Blood, Lab Collect, Print Label By Order Location, 9/12/2019 12:37 CDT  Radiology:  XR Chest 2 Views (Order): Stat, 9/12/2019 12:37 CDT, Chest Pain, None, Ambulatory, Patient Has IV?, Rad Type, Not Scheduled  Cardiology:  EKG Adult 12 Lead (Order): 9/12/2019 12:37 CDT, Stat, Once, 9/12/2019 12:37 CDT, Ambulatory, Patient Has IV, Standard Precautions, -1, -1, 9/12/2019 12:37 CDT.   Electrocardiogram:  Time 9/12/2019 12:33:00, rate 77, normal sinus  rhythm, No ST-T changes, EP Interp, Possible left atrial enlargement no acute ST or T-wave changes.  No marked depression or elevation.    Results review:  Lab results : Lab View   9/12/2019 13:52 CDT      POC Troponin              0.00 ng/mL    9/12/2019 13:50 CDT      POC BNP iSTAT             812 pg/mL  HI    9/12/2019 13:44 CDT      Sodium Lvl                140 mmol/L                             Potassium Lvl             3.5 mmol/L                             Chloride                  104 mmol/L                             CO2                       25.0 mmol/L                             Calcium Lvl               8.6 mg/dL                             Magnesium Lvl             1.8 mg/dL                             Glucose Lvl               139 mg/dL  HI                             BUN                       11.0 mg/dL                             Creatinine                1.04 mg/dL  HI                             eGFR-AA                   >60 mL/min/1.73 m2  NA                             eGFR-TANI                  60 mL/min/1.73 m2  NA                             Bili Total                0.5 mg/dL                             Bili Direct               0.10 mg/dL                             Bili Indirect             0.40 mg/dL                             AST                       14 unit/L  LOW                             ALT                       16 unit/L                             Alk Phos                  88 unit/L                             Total Protein             7.5 gm/dL                             Albumin Lvl               4.20 gm/dL                             Globulin                  3.30 gm/dL                             A/G Ratio                 1.3 ratio                             Phosphorus                4.0 mg/dL                             BNP                       438 pg/mL  HI                             Troponin-I                <0.02 ng/mL                             TSH                        3.180 mIU/L                             WBC                       6.8 x10(3)/mcL                             RBC                       4.32 x10(6)/mcL                             Hgb                       11.9 gm/dL  LOW                             Hct                       36.5 %  LOW                             Platelet                  317 x10(3)/mcL                             MCV                       84.5 fL                             MCH                       27.5 pg                             MCHC                      32.6 gm/dL  LOW                             RDW                       12.9 %                             MPV                       9.6 fL                             Abs Neut                  4.56 x10(3)/mcL                             Neutro Auto               67 %  NA                             Lymph Auto                21 %  NA                             Mono Auto                 7 %  NA                             Eos Auto                  1 %  NA                             Abs Eos                   0.0 x10(3)/mcL                             Basophil Auto             1 %  NA                             Abs Neutro                4.56 x10(3)/mcL                             Abs Lymph                 1.4 x10(3)/mcL                             Abs Mono                  0.5 x10(3)/mcL                             Abs Baso                  0.0 x10(3)/mcL  .   Radiology results:  Rad Results (ST)  < 12 hrs   Accession: [id number]  Order: XR Chest 2 Views  Report Dt/Tm: 09/12/2019 12:52  Report:      CLINICAL: Chest pain     TECHNIQUE: Comparison made to September 10, 2019     FINDINGS:  Cardiovascular silhouette is upper limits of normal.  Sternotomy wires. Lingular segment mild atelectasis or fibrosis.  Bilateral lower lung zones atelectatic changes are less visible. There  is no dense consolidation focally or segmentally, or pneumothorax.  Bilateral minimal pleural effusions.       IMPRESSION:     1. Improved bibasilar atelectasis.  2. Bilateral dependent minimal pleural effusions.      .      Reexamination/ Reevaluation   Time: 9/12/2019 16:16:00 .   Interventions: I have consult to the cardiovascular [place] pending   they have eval and  seen the patient.  They will render a recommendation..      Impression and Plan   Diagnosis   Hypertension (UNF00-IK I10)   Edema (OCY70-LF R60.9)   Chest pain in adult (DIY36-FV R07.9)   Coronary artery disease (GUU87-AY I25.10)      Calls-Consults   -  9/12/2019 16:18:00 , Zina Isidro, [facility] service.    Plan   Condition: Unchanged.    Disposition: Admit time  9/12/2019 16:47:00, Place in Observation Telemetry Unit, Patient care transitioned to: Zina Isidro.    Counseled: Patient, Family, Regarding diagnosis, Regarding diagnostic results, Regarding treatment plan, Patient indicated understanding of instructions.    Notes: I, Ashok Almanzar, acted solely as a scribe for and in the presence of Dr. Villalba who performed the service., I, John Villalba MD, a physician licensed to practice in this state, have  performed the physical evaluation,  history gathering,  and medical decision making that is reflected in this record..   ANGEL Villalba MD.

## 2022-04-29 NOTE — H&P
Patient:   Sandra Woodson             MRN: [id number]            FIN: [id number]               Age:   49 years     Sex:  Female     :  1970   Associated Diagnoses:   None   Author:   Danielle ELIAS, Tate      Basic Information   Source of history:  Self.    Present at bedside:  Medical personnel.    Referral source:  Lasha Jewell MD.       Chief Complaint   CHEST DISCOMFORT      History of Present Illness   50 Y/O FEMALE WITH SIGNIFICANT MEDICAL HISTORY OF HTN, HLD, CAD, HYPOTHYROIDISM, S/P CABG, DEPRESSION AND ANXIETY PRESENTED TO THE ED INITIALLY WITH COMPLAINTS OF CHEST DISCOMFORT AND FURTHER ADMITTED TO CIS TO R/O ACS AND ENZYMES SO AR NEGATIVE CHEST PAIN RESOLVED, REPORTED TO HAVE LEFT SHOULDER PAIN WITH ELEVATED SED RATE CONSULTED ORTHOPEDICS AND EVENUALLY IR FOR DRAINAGE TO R/O SEPTIC JOINT FOR LEFT SHOULDER AND REQUESTED MEDICINE TO ASSUME CARE       Review of Systems   Constitutional:  Weakness.    Eye:  Negative.    Ear/Nose/Mouth/Throat:  Negative.    Respiratory:  Negative.    Cardiovascular:  Chest pain.    Gastrointestinal:  Negative.    Genitourinary:  Negative.    Hematology/Lymphatics:  Negative.    Endocrine:  Negative.    Immunologic:  Negative.    Musculoskeletal:  Neck pain, Decreased range of motion.    Integumentary:  Negative.    Neurologic:  Alert and oriented X4, Abnormal balance, Numbness, Tingling, Headache.    Psychiatric:  Negative.    All other systems are negative      Health Status   Allergies:    Allergic Reactions (Selected)  No Known Medication Allergies,    Allergies (1) Active Reaction  No Known Medication Allergies None Documented     Current medications:  (Selected)   Inpatient Medications  Ordered  Core.25 mg, form: Tab, Oral, BID, first dose 20 21:00:00 CST  Imdur 30 mg oral tablet, extended release: 60 mg, form: Tab-ER, Oral, Daily, first dose 20 10:00:00 CST  Norco 5 mg-325 mg oral tablet: 1 tab(s), form: Tab, Oral, q6hr PRN for pain, first  dose 20 15:15:00 CST, Waste Code JASVIR  Plavix 75 mg oral tablet: 75 mg, form: Tab, Oral, Daily, first dose 20 10:00:00 CST  Tylenol 325 mg oral tablet: 650 mg, form: Tab, Oral, q4hr PRN for pain, first dose 20 23:02:00 CST  Xanax 0.5 mg oral tablet: 1 mg, form: Tab, Oral, TID PRN for anxiety, first dose 20 23:01:00 CST  Zofran ODT 4 mg oral tablet, disintegratin mg, form: Tab-Dis, Oral, q4hr PRN for nausea, first dose 20 10:34:00 CST  aspirin 81 mg oral tablet, CHEWABLE: 81 mg, form: Tab-Chew, Oral, Daily, first dose 20 9:00:00 CST  atorvastatin 40 mg oral tablet: 40 mg, form: Tab, Oral, Daily, first dose 20 17:00:00 CST  cloniDINE 0.1 mg oral tablet: 0.1 mg, form: Tab, Oral, BID PRN for hypertension, first dose 20 9:21:00 CST  hydrALAZINE (Apresoline) Inj.: 10 mg, form: Injection, IV Push, q2hr PRN for hypertension, first dose 20 0:06:00 CST, SBP > 160  hydrALAZINE 20 mg/mL injectable solution: 20 mg, form: Injection, IV, q6hr PRN for hypertension, first dose 20 23:07:00 CST  labetalol 5 mg/mL intravenous solution: 10 mg, form: Soln, IV Push, q4hr PRN for hypertension, first dose 20 23:04:00 CST  levothyroxine 50 mcg (0.05 mg) oral tablet: 50 mcg, form: Tab, Oral, qAM, first dose 20 10:00:00 CST  losartan: 100 mg, form: Tab, Oral, Daily, first dose 20 9:00:00 CST  nitroglycerin: 0.4 mg, form: Tab-SL, SL, q5min PRN for chest pain, Order duration: 3 dose(s), first dose 20 22:42:00 CST, stop date Limited # of times, Hold if SBP < 100  sertraline 50 mg oral tablet: 50 mg, form: Tab, Oral, Daily, first dose 20 10:00:00 CST  tiZANidine: 4 mg, form: Tab, Oral, q8hr, first dose 20 10:00:00 CST  traMADol: 50 mg, form: Tab, Oral, q6hr PRN for pain, moderate, first dose 20 13:13:00 CST  zolpidem 5 mg oral tablet: 10 mg, form: Tab, Oral, At Bedtime, first dose 20 21:00:00 CST  Prescriptions  Prescribed  Coreg 6.25 mg  oral tablet: 6.25 mg = 1 tab(s), Oral, BID, Please hold if BP<100/60, # 60 tab(s), 11 Refill(s)  aspirin 81 mg oral tablet, CHEWABLE: 81 mg = 1 tab(s), Oral, Daily, # 30 tab(s), 0 Refill(s)  atorvastatin 40 mg oral tablet: 40 mg = 1 tab(s), Oral, Daily, # 30 tab(s), 0 Refill(s)  cloniDINE 0.1 mg oral tablet: 0.1 mg = 1 tab(s), Oral, BID, PRN PRN hypertension, # 20 tab(s), 0 Refill(s)  hydrOXYzine hydrochloride 25 mg oral tablet: 25 mg = 1 tab(s), Oral, At Bedtime, PRN PRN as needed for anxiety, # 25 tab(s), 0 Refill(s)  Documented Medications  Documented  NIFEdipine 60 mg oral tablet, extended release: 60 mg = 1 tab(s), Oral, qPM  Plavix 75 mg oral tablet: 75 mg = 1 tab(s), Oral, Daily, 0 Refill(s)  Xanax 1 mg oral tablet: 1 mg = 1 tab(s), Oral, TID, PRN PRN for anxiety, 0 Refill(s)  levothyroxine 50 mcg (0.05 mg) oral tablet: 50 mcg = 1 tab(s), Oral, qAM  losartan 100 mg oral tablet: 100 mg = 1 tab(s), Oral, Daily  nitroglycerin 0.4 mg sublingual TAB: 0.4 mg = 1 tab(s), SL, q5min, PRN PRN for chest pain, # 1 bottle(s), 0 Refill(s)  sertraline 50 mg oral tablet: 50 mg = 1 tab(s), Oral, Daily  tiZANidine 4 mg oral tablet: 4 mg = 1 tab(s), Oral, q8hr  zolpidem 12.5 mg oral tablet, extended release: 12.5 mg = 1 tab(s), Oral, qPM,    Medications (20) Active  Scheduled: (10)  aspirin 81 mg Chew tab  81 mg 1 tab(s), Oral, Daily  atorvastatin 40 mg Tab  40 mg 1 tab(s), Oral, Daily  carvedilol 3.125 mg Tab UD  6.25 mg 2 tab(s), Oral, BID  clopidogrel 75 mg Tab UD  75 mg 1 tab(s), Oral, Daily  isosorbide mononitrate 30 mg CR  UD  60 mg 2 tab(s), Oral, Daily  levothyroxine 0.05 mg Tab UD  50 mcg 1 tab(s), Oral, qAM  losartan 25 mg Tab UD  100 mg 4 tab(s), Oral, Daily  sertraline 50 mg Tab UD  50 mg 1 tab(s), Oral, Daily  tizanidine 4 mg Tab  4 mg 1 tab(s), Oral, q8hr  zolpidem 5 mg Tab UD  10 mg 2 tab(s), Oral, At Bedtime  Continuous: (0)  PRN: (10)  acetaminophen 325 mg Tab  650 mg 2 tab(s), Oral, q4hr  alprazolam 0.5 mg Tab  UD  1 mg 2 tab(s), Oral, TID  cloniDINE 0.1 mg Tab UD  0.1 mg 1 tab(s), Oral, BID  hydrALAzine 20 mg/ml Inj- 1 mL  10 mg 0.5 mL, IV Push, q2hr  hydrALAzine 20 mg/ml Inj- 1 mL  20 mg 1 mL, IV, q6hr  hydrocodone 5mg/APAP 325 mg  1 tab(s), Oral, q6hr  labetalol 5 mg/mL 20mL vial  10 mg 2 mL, IV Push, q4hr  Nitroglycerin 0.4 mg TAB  0.4 mg 1 tab(s), SL, q5min  ondansetron 4 mg ODT Tab  4 mg 1 tab(s), Oral, q4hr  traMADOL 50 mg Tab UD  50 mg 1 tab(s), Oral, q6hr     Problem list:    All Problems  Anxiety and depression / SNOMED CT 95803462 / Confirmed  Chest pain / SNOMED CT 86764872 / Confirmed  CAD (coronary artery disease) / SNOMED CT 69241536 / Confirmed  History of hypothyroidism / SNOMED CT 0176008809 / Confirmed  HLD - Hyperlipidemia / SNOMED CT 628245228 / Confirmed  Hypothyroidism / SNOMED CT 29508451 / Confirmed  Anemia, iron deficiency / SNOMED CT 313091994 / Confirmed  MI - myocardial infarction / SNOMED CT 7113572339 / Confirmed  Morbid obesity / SNOMED CT 174399234 / Confirmed  Pain in both legs / SNOMED CT 200964832149535 / Confirmed  SOB - Shortness of breath / SNOMED CT 794059547 / Confirmed,    Active Problems (11)  Anemia, iron deficiency   Anxiety and depression   CAD (coronary artery disease)   Chest pain   History of hypothyroidism   HLD - Hyperlipidemia   Hypothyroidism   MI - myocardial infarction   Morbid obesity   Pain in both legs   SOB - Shortness of breath         Histories   Past Medical History:    Resolved  Asthma (054732397):  Resolved.  Comments:  7/31/2019 CDT 15:21 CDT - Juanito CONTRERAS, [facility]  as child  Kidney infection (234811023):  Resolved.   Family History:    Throat cancer  Father  Breast cancer  Mother     Procedure history:    Bypass CABG (.) performed by Claudette Clements MD on 8/1/2019 at 48 Years.  Comments:  8/1/2019 10:48 CDT - Nafisa Cabrera RN  auto-populated from documented surgical case  Colonoscopy (None) performed by VIRAJ Hernandez MD on 5/13/2019 at 48  Years.  Comments:  5/14/2019 14:22 CDT - Genoveva Welch LPN  auto-populated from documented surgical case  knee meniscus in 2015 at 45 Years.  Knee meniscus (SNOMED CT 669335268) in 2013 at 43 Years.  Oophorectomy (SNOMED CT 980957350) in 2009 at 39 Years.  Cholecystectomy (SNOMED CT 27796025) in 2005 at 35 Years.  H/O: hysterectomy (SNOMED CT 498553476) in 2003 at 33 Years.  cardiac stent placement.  Esophagogastroduodenoscopy (SNOMED CT 138445500).  Extraction of wisdom tooth (SNOMED CT 363675728).  EA - Endometrial ablation (SNOMED CT 323103721).  TL - Tubal ligation (SNOMED CT 671274219).   Social History        Social & Psychosocial Habits    Tobacco  07/31/2019  Use: Former smoker, quit more    Patient Wants Consult For Cessation Counseling N/A    Comment: quit 24 years ago - 07/18/2019 09:45 - Ale Soto RN    08/01/2019  Use: Former smoker, quit more    Patient Wants Consult For Cessation Counseling No    09/12/2019  Use: Former smoker, quit more    Patient Wants Consult For Cessation Counseling N/A    09/14/2019  Use: Former smoker, quit more    Patient Wants Consult For Cessation Counseling No    11/18/2019  Use: Former smoker, quit more    Patient Wants Consult For Cessation Counseling No    02/24/2020  Use: Former smoker, quit more    Patient Wants Consult For Cessation Counseling N/A    Abuse/Neglect  07/18/2019  SHX Any signs of abuse or neglect No    07/31/2019  SHX Any signs of abuse or neglect No    08/01/2019  SHX Any signs of abuse or neglect No    09/12/2019  SHX Any signs of abuse or neglect No    09/14/2019  SHX Any signs of abuse or neglect No    11/18/2019  SHX Any signs of abuse or neglect No    11/18/2019  SHX Any signs of abuse or neglect No    02/24/2020  SHX Any signs of abuse or neglect No  .        Physical Examination   General:  Alert and oriented.    Eye:  Pupils are equal, round and reactive to light, Extraocular movements are intact, Normal conjunctiva, Vision unchanged.     HENT:  Normocephalic, Normal hearing, Oral mucosa is moist, No pharyngeal erythema.    Neck:  Supple, Non-tender, No carotid bruit.    Respiratory:  Lungs are clear to auscultation, Respirations are non-labored, Breath sounds are equal, No chest wall tenderness.    Cardiovascular:  Normal rate, Regular rhythm, No murmur, No gallop.    Gastrointestinal:  Soft, Non-tender, Non-distended, Normal bowel sounds, No organomegaly.    Genitourinary:  No costovertebral angle tenderness, No inguinal tenderness, No urethral discharge.       Vital Signs (last 24 hrs)_____  Last Charted___________  Temp Oral     36.5 DegC  (FEB 27 07:41)  Heart Rate Peripheral   73 bpm  (FEB 27 07:41)  Resp Rate         20 br/min  (FEB 27 07:41)  SBP      108 mmHg  (FEB 27 07:41)  DBP      76 mmHg  (FEB 27 07:41)  SpO2      97 %  (FEB 27 07:41)  Weight      94.3 kg  (FEB 27 05:00)     Lymphatics:  No lymphadenopathy neck, axilla, groin.    Musculoskeletal:  Normal range of motion, Normal strength.    Neurologic:  Alert, Oriented, Normal sensory, Normal motor function, No focal deficits, Cranial Nerves II-XII are grossly intact.    Psychiatric:  Cooperative, Appropriate mood & affect, Normal judgment, Non-suicidal.       Review / Management   Results review:     Labs (Last four charted values)  WBC                  6.5 (FEB 24)   Hgb                  L 11.5 (FEB 24)   Hct                  L 35.3 (FEB 24)   Plt                  239 (FEB 24)   Na                   138 (FEB 24)   K                    4.7 (FEB 24)   CO2                  28.0 (FEB 24)   Cl                   102 (FEB 24)   Cr                   0.92 (FEB 24)   BUN                  17.0 (FEB 24)   Glucose Random       H 125 (FEB 24)   PT                   12.7 (FEB 24)   INR                  1.0 (FEB 24)   PTT                  H 36.7 (FEB 24) .       Impression and Plan   ?SEPTIC JOINT  CAD  H/O CABG  CHEST PAIN - RESOLVED  HLD  ANXIETY  DEPRESSION  HYPOTHYROIDISM    PLAN :  IVF  ORTHO  RECCS  FOLLOW IR DRAINAGE  FOLLOW CX  CONTINUE CURRENT CARE  LABS IN AM  SCDS FOR DVT PPX  ADM TIME 71 MIN  CODE STATSU FULL

## 2022-04-29 NOTE — DISCHARGE SUMMARY
Patient:   Sandra Woodson             MRN: [id number]            FIN: [id number]               Age:   48 years     Sex:  Female     :  1970   Associated Diagnoses:   None   Author:   Misael ELIAS, Dony RECINOS      Discharge Information      Discharge Summary Information   Admit/Discharge Dates   Admit Date: 2019  Discharge Date: 2019   Physicians   Attending Physician - Misael ELIAS, Dony RECINOS  Attending Physician - Hans ELIAS, Shaw BELLA  Attending Physician - Que Underwood MD  Admitting Physician - Hans ELIAS, Shaw BELLA  Consulting Physician - Tristian ELIAS, Demario MCCARTHY  Primary Care Physician - Raymond ELIAS , Keo Peterson   Discharge Diagnosis   Dorsalgia, unspecified (M54.9)   Chronic kidney disease, unspecified (N18.9)   Atherosclerotic heart disease of native coronary artery without angina pectoris (I25.10)   Essential (primary) hypertension (I10)   Hypothyroidism, unspecified (E03.9)    Procedures   No procedures recorded for this visit.   Immunizations   No immunizations recorded for this visit.   Discharge Medications   Prescribed  furosemide (Lasix 40 mg oral tablet) 40 mg, Oral, Daily  Continue  alPRAzolam (alPRAzolam 1 mg oral tablet) 1 mg, Oral, TID  aspirin (aspirin 81 mg oral tablet, CHEWABLE) 81 mg, Oral, Daily  atorvastatin (atorvastatin 40 mg oral tablet) 40 mg, Oral, Daily  clopidogrel (Plavix 75 mg oral tablet) 75 mg, Oral, Daily  ferrous sulfate (ferrous sulfate 325 mg (65 mg elemental iron) oral delayed release tablet) 325 mcmol/L, Oral, Daily  hydrOXYzine (hydrOXYzine hydrochloride 25 mg oral tablet) 25 mg, Oral, q8hr, PRN as needed for anxiety  ketorolac (ketorolac 10 mg oral tablet) 10 mg, Oral, q6hr, PRN pain  levothyroxine (levothyroxine 50 mcg (0.05 mg) oral tablet) 50 mcg, Oral, qAM  lisinopril (lisinopril 10 mg oral tablet) 10 mg, Oral, Daily  metoprolol (Toprol-XL 25 mg oral tablet, extended release) 25 mg, Oral, Daily  omega-3 polyunsaturated fatty acids (omega-3 polyunsaturated  fatty acids ethyl esters 1000 mg oral capsule) 2,000 mg, Oral, BID  sertraline (sertraline 50 mg oral tablet) 50 mg, Oral, Daily  tiZANidine (tiZANidine 4 mg oral tablet) 4 mg, Oral, q8hr, PRN muscle pain      Education   Back Pain, Adult, Easy-to-Read  Chest Pain (Nonspecific), Easy-to-Read-[place] (Custom)  Hypertension, Easy-to-Read  Discharge - 04/09/19 14:17:00 CDT, Home, Give all scheduled vaccinations prior to discharge.   Discharge Activity - Activity as Tolerated   Discharge Diet - Cardiac       Followup   Report to Emergency Department if symptoms return or worsen  Demario Lubin   Call office when you are ready to schedule follow up appointment  Keo Andrade MD   Call for followup appointment      Hospital Course   Hospital Course   Please see today's hospitalist progress note for full discharge summary     Appreciate pulmoary recs. No further inpatient work up necessary. May follow up with PCP as needed. Will send home with trial of lasix. will need BMp in one week    Time to discharge 31 minutes.        Physical Examination      Vital Signs (last 24 hrs)_____  Last Charted___________  Temp Oral     37.3 DegC  (APR 09 14:16)  Heart Rate Peripheral   93 bpm  (APR 09 14:16)  Resp Rate         17 br/min  (APR 09 14:16)  SBP      132 mmHg  (APR 09 14:16)  DBP      74 mmHg  (APR 09 14:16)  SpO2      97 %  (APR 09 14:16)  Weight      99.1 kg  (APR 09 04:00)     General: No acute distress, Awake, Alert,    Oriented x3     HEENT: PERRL, EOMI, no scleral icterus, OP clear    Respiratory:    CTA bilateral  , no wheezes, rales, or rhonchi    Cardiovascular:    Regular rate and rhythm  , no murmurs, rubs or gallops, +s1/s2,    _     Gastrointestinal: soft,    nontender  , nondistended, +BS    Musculoskeletal: Normal range of motion, no pertinent deformity    Integumentary: clean, warm, dry, intact    Neurologic: CN 2 - 12 grossly intact, no acute deficit noted

## 2022-04-29 NOTE — CONSULTS
Patient:   Sandra Woodson             MRN: [id number]            FIN: [id number]               Age:   48 years     Sex:  Female     :  1970   Associated Diagnoses:   None   Author:   Tristian ELIAS, Demario HAMPTON.      Patient is a 48-year-old female with significant recent past medical history , who was just recently discharged from a hospital admission , 2 days ago returned yesterday to the ER, during that admission underwent a workup for symptomatic anemia when she presented hypotensive and with a hemoglobin of 7.9, and received a total of 2 units of blood during that hospitalization.  Her occult stools were negative and there was no clear etiology of her anemia.  During that admission she was complaining of chest pain which was deemed to be not cardiac in origin, but it is noted she does have coronary artery disease and underwent PCI of her left main and left circumflex oin 3/2019 and is currently on dual antiplatelet therapy.  She presented to the ER this evening stating that since going home yesterday she has started to develop severe lower back pain.  She denied any falls or trauma to this area, or any prior history of back pain in the past.  She also reported that it was making it difficult for her to breathe.  On arrival to the ER she was tachypnea, no maintaining adequate oxygen saturations, and was significantly hypertensive.  She underwent a CTA of her chest which showed some perihilar and lower lobe interstitial edema, but no evidence of pulmonary embolus or dissection noted.  EKG this evening only showed normal sinus rhythm, her cardiac enzymes are within normal limits.  She was given IV analgesics and admitted to the hospitalist service for further evaluation.    I'm asked to see her to comment on the symptoms from her recent shortness of breath possible and small airway disease and to comment on her CAT scan.    Full-story taken again.  She is escorted by a very careful to details son.  She  admits that in the past couple of hospitalizations she is gained so much weight and fluid that she couldn't recognize herself and her face this morning.  There was an issue with her kidney function apparently.  The last hospitalization was associated with hypotension yet she takes blood pressure for hypertension and the recently placed 2 stents from her March 11 hospitalization is well outlined.  However she did have history of coronary disease from 11 years ago and she used to be on Lasix which she has not been on the past few days.    The active new problem however is this low back pain that is non-characteristic or non-radiculopathic at least without any preceding trauma, to the point that she was placed on narcotics the last discharged this past Saturday, however during my interview she was cold and shivering from the pain and her heart rate is in the 120s, wondering if she is having purulent pain or withdrawal from the narcotics.  At any rate the characteristic of the low back pain is not so obvious yet and to shorten its onset to be considering an MRI as an inpatient, as it was suggested to her by the ER upon admission last night.    The other thing is that no doubt she has underlying sleep apnea her oropharynx and neck habitus are very much suggestive that she snores.  This is important to be checking out after she is discharged especially that she is fatigued and tired all the time per her report.    At any rate from the CAT scan perspective I do not see much going on from the parenchyma however she does have small non-sizable effusions with some non-characteristic interstitial prominence on the right side that could be fluid for all purposes.  I do not see much of an infectious process although viral pneumonitis cannot be excluded.    Would like to sort out things because she has been in the hospital 3 times already in less than a month.  I think fluid ultimately discontinue her creatinine is slightly up  still from her baseline on 4/3 where she was normal at 0.97, white today she is at 1.35.    I think we'll get a bite the bullet and give her just 1 mg of Bumex.  We will assess her respiratory symptoms accordingly.  Would be very judicious in how to manage her pain and although I'd like to stay away from the nonsteroid Toradol may not be a bad idea carefully given along with the Norco.  There is no need for Dilaudid that I can see right now.    More ideas to come out after assessing her response.

## 2022-04-29 NOTE — CONSULTS
Patient:   Sadnra Woodson             MRN: [id number]            FIN: [id number]               Age:   49 years     Sex:  Female     :  1970   Associated Diagnoses:   Acute pain of left shoulder   Author:   Jaylene Fleming      Basic Information   Source of history:  Self, Medical record.    History limitation:  None.       Chief Complaint   acute left shoulder pain      History of Present Illness   Ms. Woodson is a 49 year old female who was admitted to [facility] with chest pain and hypertension. She reports hx of CABG in August with labile BP since. She states that on , she started having pain to the left shoulder. She denies any injury, fall, or other trauma. She denies any previous history of left shoulder problems. She states that she took ibuprofen and some leftover codeine cough syrup at home with no relief. On Monday, the pain continued, and she became worried that it may be related to her heart or a blood clot. She presented to [facility] ER. She had a cardiac workup and was admitted. She states that the pain to her left shoulder continues. The pain is constant and severe. Any attempted motion makes it worse. She has not had any nausea, vomiting, fever, or chills. She has not had any recent open wound to the area. She is not on any immunosuppressive medications.       Review of Systems   Constitutional:  Negative.    Respiratory:  Negative.    Cardiovascular:  Negative except as documented in history of present illness.    Gastrointestinal:  Negative.    Genitourinary:  Negative.    Immunologic:  Negative.    Musculoskeletal:  Negative except as documented in history of present illness.    Integumentary:  Negative.    Neurologic:  Negative.    All other systems are negative      Health Status   Allergies:    Allergic Reactions (All)  No Known Medication Allergies,    Allergies (1) Active Reaction  No Known Medication Allergies None Documented     Current medications:  (Selected)    Inpatient Medications  Ordered  Core.25 mg, form: Tab, Oral, BID, first dose 20 21:00:00 CST  Imdur 30 mg oral tablet, extended release: 60 mg, form: Tab-ER, Oral, Daily, first dose 20 10:00:00 CST  Plavix 75 mg oral tablet: 75 mg, form: Tab, Oral, Daily, first dose 20 10:00:00 CST  Tylenol 325 mg oral tablet: 650 mg, form: Tab, Oral, q4hr PRN for pain, first dose 20 23:02:00 CST  Xanax 0.5 mg oral tablet: 1 mg, form: Tab, Oral, TID PRN for anxiety, first dose 20 23:01:00 CST  Zofran ODT 4 mg oral tablet, disintegratin mg, form: Tab-Dis, Oral, q4hr PRN for nausea, first dose 20 10:34:00 CST  aspirin 81 mg oral tablet, CHEWABLE: 81 mg, form: Tab-Chew, Oral, Daily, first dose 20 9:00:00 CST  atorvastatin 40 mg oral tablet: 40 mg, form: Tab, Oral, Daily, first dose 20 17:00:00 CST  cloniDINE 0.1 mg oral tablet: 0.1 mg, form: Tab, Oral, BID PRN for hypertension, first dose 20 9:21:00 CST  hydrALAZINE (Apresoline) Inj.: 10 mg, form: Injection, IV Push, q2hr PRN for hypertension, first dose 20 0:06:00 CST, SBP > 160  hydrALAZINE 20 mg/mL injectable solution: 20 mg, form: Injection, IV, q6hr PRN for hypertension, first dose 20 23:07:00 CST  labetalol 5 mg/mL intravenous solution: 10 mg, form: Soln, IV Push, q4hr PRN for hypertension, first dose 20 23:04:00 CST  levothyroxine 50 mcg (0.05 mg) oral tablet: 50 mcg, form: Tab, Oral, qAM, first dose 20 10:00:00 CST  losartan: 100 mg, form: Tab, Oral, Daily, first dose 20 9:00:00 CST  nitroglycerin: 0.4 mg, form: Tab-SL, SL, q5min PRN for chest pain, Order duration: 3 dose(s), first dose 20 22:42:00 CST, stop date Limited # of times, Hold if SBP < 100  sertraline 50 mg oral tablet: 50 mg, form: Tab, Oral, Daily, first dose 20 10:00:00 CST  tiZANidine: 4 mg, form: Tab, Oral, q8hr, first dose 20 10:00:00 CST  traMADol: 50 mg, form: Tab, Oral, q6hr PRN for pain, moderate,  first dose 02/25/20 13:13:00 CST  zolpidem 5 mg oral tablet: 10 mg, form: Tab, Oral, At Bedtime, first dose 02/25/20 21:00:00 CST  Prescriptions  Prescribed  Coreg 6.25 mg oral tablet: 6.25 mg = 1 tab(s), Oral, BID, Please hold if BP<100/60, # 60 tab(s), 11 Refill(s)  aspirin 81 mg oral tablet, CHEWABLE: 81 mg = 1 tab(s), Oral, Daily, # 30 tab(s), 0 Refill(s)  atorvastatin 40 mg oral tablet: 40 mg = 1 tab(s), Oral, Daily, # 30 tab(s), 0 Refill(s)  cloniDINE 0.1 mg oral tablet: 0.1 mg = 1 tab(s), Oral, BID, PRN PRN hypertension, # 20 tab(s), 0 Refill(s)  hydrOXYzine hydrochloride 25 mg oral tablet: 25 mg = 1 tab(s), Oral, At Bedtime, PRN PRN as needed for anxiety, # 25 tab(s), 0 Refill(s)  Documented Medications  Documented  NIFEdipine 60 mg oral tablet, extended release: 60 mg = 1 tab(s), Oral, qPM  Plavix 75 mg oral tablet: 75 mg = 1 tab(s), Oral, Daily, 0 Refill(s)  Xanax 1 mg oral tablet: 1 mg = 1 tab(s), Oral, TID, PRN PRN for anxiety, 0 Refill(s)  levothyroxine 50 mcg (0.05 mg) oral tablet: 50 mcg = 1 tab(s), Oral, qAM  losartan 100 mg oral tablet: 100 mg = 1 tab(s), Oral, Daily  nitroglycerin 0.4 mg sublingual TAB: 0.4 mg = 1 tab(s), SL, q5min, PRN PRN for chest pain, # 1 bottle(s), 0 Refill(s)  sertraline 50 mg oral tablet: 50 mg = 1 tab(s), Oral, Daily  tiZANidine 4 mg oral tablet: 4 mg = 1 tab(s), Oral, q8hr  zolpidem 12.5 mg oral tablet, extended release: 12.5 mg = 1 tab(s), Oral, qPM   Problem list:    All Problems  Anxiety and depression / SNOMED CT 55565126 / Confirmed  Chest pain / SNOMED CT 99927993 / Confirmed  CAD (coronary artery disease) / SNOMED CT 58274826 / Confirmed  History of hypothyroidism / SNOMED CT 3234640842 / Confirmed  HLD - Hyperlipidemia / SNOMED CT 467184326 / Confirmed  Hypothyroidism / SNOMED CT 87269332 / Confirmed  Anemia, iron deficiency / SNOMED CT 387874427 / Confirmed  MI - myocardial infarction / SNOMED CT 9126796888 / Confirmed  Morbid obesity / SNOMED CT 251128590 /  Confirmed  Pain in both legs / SNOMED CT 450623882867356 / Confirmed  SOB - Shortness of breath / SNOMED CT 187589986 / Confirmed      Histories   Past Medical History:    Resolved  Asthma ([id number]):  Resolved.  Comments:  7/31/2019 CDT 15:21 CDT - Juanito CONTRERAS, [facility]  as child  Kidney infection ([id number]):  Resolved.   Family History:    Throat cancer  Father  Breast cancer  Mother     Procedure history:    Bypass CABG (.) on 8/1/2019 at 48 Years.  Comments:  8/1/2019 10:48 CDT - Bishop CONTRERAS, Nafisa DIA  auto-populated from documented surgical case  Colonoscopy (None) on 5/13/2019 at 48 Years.  Comments:  5/14/2019 14:22 CDT - Genoveva Welch LPN  auto-populated from documented surgical case  knee meniscus in 2015 at 45 Years.  Knee meniscus ([id number]) in 2013 at 43 Years.  Oophorectomy (352643921) in 2009 at 39 Years.  Cholecystectomy ([id number]) in 2005 at 35 Years.  H/O: hysterectomy (653670393) in 2003 at 33 Years.  cardiac stent placement.  Esophagogastroduodenoscopy (921141895).  Extraction of wisdom tooth (162494807).  EA - Endometrial ablation (160889774).  TL - Tubal ligation (782949916).   Social History        Social & Psychosocial Habits    Tobacco  02/24/2020  Use: Former smoker, quit more    Patient Wants Consult For Cessation Counseling N/A    Abuse/Neglect  02/24/2020  SHX Any signs of abuse or neglect No  .        Physical Examination   Vital Signs   2/26/2020 12:00 CST      Heart Rate Monitored      82 bpm    2/26/2020 11:00 CST      Temperature Oral          37.0 DegC                             Temperature Oral (calculated)             98.60 DegF                             Peripheral Pulse Rate     92 bpm                             SpO2                      98 %                             Systolic Blood Pressure   143 mmHg  HI                             Diastolic Blood Pressure  85 mmHg                             Mean Arterial Pressure, Cuff              104 mmHg       Labs Last 24 Hours    No qualifying data available.    General: well-developed, well-nourished, in no acute distress; sitting up on side of bed eating lunch; L UE propped on pillow  Head: atraumatic, normocephalic  Neck: full range of motion with no pain  Respiratory: symmetric chest rise/fall; respirations even and non labored  Cardiovascular: regular rate, peripheral pulses palpable; no edema  Gastrointestinal: soft, non-tender, non-distended   Musculoskeletal:   Left upper extremity:  no erythema,  abrasions, or open wounds  no apparent swelling; compartments soft and compressible  exquisite pain with any attempted shoulder motion  no pain at the elbow or wrist  AIN/PIN/ulnar motor intact  motor intact to digits  SLT intact distally  BCR distally  RP 2+    other extremities are free from any obvious swelling, deformities, open wounds or tenderness  Integumentary: skin pink, warm, and dry. Intact.  Neurologic: AAOx4. In no acute distress    x-rays of the left shoulder demonstrate no acute fracture, dislocation, or joint space narrowing      Impression and Plan   Diagnosis     Acute pain of left shoulder (OLK89-PH M25.512).     Orders     She has a history of severe, acute left shoulder pain with no history of trauma and normal x-rays. Suspicion for infectious process vs gout is low, but it is pertinent to rule out any septic joint before proceeding to treat her symptomatically. I have consulted interventional radiology for a left shoulder aspiration and will send the fluid for gram stain, cultures, cell count, and crystals. If we are able to rule out infection, we can consider a steroid injection for symptomatic relief. She may even need an MRI as an outpatient. I will add toradol for pain. We will follow up once we have results. Primary team has ordered an US to r/u DVT. All questions and concerns were addressed and the patient is happy with this plan of care.     I, Jaylene Smith NP, have scribed this note in the presence of   Mateo Barajas who has personally examined the patient today.  .

## 2022-04-29 NOTE — H&P
Patient:   Sandra Woodson             MRN: [id number]            FIN: [id number]               Age:   48 years     Sex:  Female     :  1970   Associated Diagnoses:   None   Author:   VIRAJ Hernandez MD      Chief Complaint   Unexplained iron deficiency anemia      History of Present Illness   48-year-old individual who is being evaluated for an unexplained iron deficiency anemia.  She is not menstruating.  She had coronary stenting and Plavix antiplatelet therapy starting about 2 months ago.  However prior to this she had been noticed to have an unexplained anemia and some fatigue.  She denied any hematemesis melena or hematochezia.  She has no nosebleeds.  There is no family history of colon cancer.      Health Status   Allergies:    Allergic Reactions (Selected)  No Known Medication Allergies   Current medications:  (Selected)   Inpatient Medications  Ordered  Buffered Lidocaine 1% - 1mL syringe: 0.5 mL, 5 mg =, form: Injection, ID, As Directed PRN for other (see comment), first dose 19 9:15:00 CDT, May inject 0.5mL at IV site, if not allergic  Plasmalyte 1,000 mL: 1,000 mL, 1,000 mL, IV, 20 mL/hr, start date 19 9:15:00 CDT  Sodium Chloride 0.9% intravenous solution 1,000 mL: 1,000 mL, 1,000 mL, IV, 20 mL/hr, start date 19 9:15:00 CDT, May use NS if plasmalyte not available  citric acid-sodium citrate 334 mg-500 mg/5 mL oral solution: 30 mL, form: Soln, Oral, Once, first dose 19 10:00:00 CDT, stop date 19 10:00:00 CDT, administer if obese (BMI>30) or diabetic or has GERD/reflux or is not NPO  Prescriptions  Prescribed  Lasix 40 mg oral tablet: 40 mg = 1 tab(s), Oral, Daily, # 30 tab(s), 0 Refill(s), Pharmacy: [facility]  Plavix 75 mg oral tablet: 75 mg = 1 tab(s), Oral, Daily, # 30 tab(s), 0 Refill(s)  aspirin 81 mg oral tablet, CHEWABLE: 81 mg = 1 tab(s), Oral, Daily, # 30 tab(s), 0 Refill(s)  atorvastatin 40 mg oral tablet: 40 mg = 1 tab(s),  Oral, Daily, # 30 tab(s), 0 Refill(s)  ferrous sulfate 325 mg (65 mg elemental iron) oral delayed release tablet: 325 mcmol/L = 1 tab(s), Oral, Daily, # 30 tab(s), 1 Refill(s)  omega-3 polyunsaturated fatty acids ethyl esters 1000 mg oral capsule: 2,000 mg = 2 cap(s), Oral, BID, # 120 cap(s), 0 Refill(s)  Documented Medications  Documented  Amoxil 875 mg oral tablet: 875 mg = 1 tab(s), Oral, BID  Promethazine (Plain) Syrup 6.25 mg / 5 mL:   Toprol-XL 25 mg oral tablet, extended release: 25 mg = 1 tab(s), Oral, Daily, 0 Refill(s)  Toradol 10 mg oral tablet: Oral, q6hr  acetaminophen-hydrocodone 325 mg-10 mg oral tablet: 1 tab(s), Oral, q6hr  alPRAzolam 1 mg oral tablet: 1 mg = 1 tab(s), Oral, TID  benzonatate 100 mg oral capsule: 100 mg = 1 cap(s), Oral, TID  cefdinir 300 mg oral capsule: 300 mg = 1 cap(s), Oral, BID  cetirizine 10 mg oral tablet: 10 mg = 1 tab(s), Oral, Daily  codeine-promethazine 10 mg-6.25 mg/5 mL oral syrup:   escitalopram 10 mg oral tablet: 10 mg = 1 tab(s), Oral, Daily  ferrous sulfate 325 mg (65 mg elemental iron) oral tablet: 325 mg = 1 tab(s), Oral, Daily  furosemide 20 mg oral tablet: 20 mg = 1 tab(s), Oral, Daily  hydrOXYzine hydrochloride 25 mg oral tablet: 25 mg = 1 tab(s), Oral, q8hr, PRN PRN as needed for anxiety  hydrochlorothiazide-triamterene 25 mg-37.5 mg oral tablet: 1 tab(s), Oral, Daily  levothyroxine 50 mcg (0.05 mg) oral tablet: 50 mcg = 1 tab(s), Oral, qAM  lisinopril 10 mg oral tablet: 10 mg = 1 tab(s), Oral, Daily  metoprolol succinate 100 mg oral tablet, extended release: 100 mg = 1 tab(s), Oral, Daily  nitroglycerin 0.4 mg sublingual TAB:   ondansetron 8 mg oral tablet, disintegratin mg = 1 tab(s), Oral, q8hr  oseltamivir 75 mg oral capsule: 75 mg = 1 cap(s), Oral, BID  sertraline 50 mg oral tablet: 50 mg = 1 tab(s), Oral, Daily  tiZANidine 4 mg oral tablet: 4 mg = 1 tab(s), Oral, q8hr, PRN PRN muscle pain  zolpidem 12.5 mg oral tablet, extended release: 12.5 mg = 1  tab(s), Oral, Daily      Histories   Past Medical History:    No active or resolved past medical history items have been selected or recorded.   Family History:    Throat cancer  Father  Breast cancer  Mother     Procedure history:    knee meniscus in 2015 at 45 Years.  Knee meniscus ([id number]) in 2013 at 43 Years.  Oophorectomy (049790179) in 2009 at 39 Years.  Cholecystectomy ([id number]) in 2005 at 35 Years.  H/O: hysterectomy (086049475) in 2003 at 33 Years.  cardiac stent placement.   Social History        Social & Psychosocial Habits    Tobacco  05/27/2015  Use: Never smoker    03/12/2019  Use: Former smoker, quit more    Patient Wants Consult For Cessation Counseling N/A    04/03/2019  Use: Never (less than 100 in l    Patient Wants Consult For Cessation Counseling N/A    04/08/2019  Use: Former smoker, quit more    Patient Wants Consult For Cessation Counseling No    05/13/2019  Use: Former smoker, quit more    Patient Wants Consult For Cessation Counseling N/A    Comment: quit over 20 yrs ago - 05/13/2019 09:09 - Stan CONTRERAS, Bj MALAVE        Physical Examination   General:  Increased BMI.    Respiratory:  Lungs are clear to auscultation.    Cardiovascular:  Normal rate.    Gastrointestinal:  Soft.       Vital Signs (last 24 hrs)_____  Last Charted___________  Temp Tympanic    36.1 DegC  (MAY 13 09:07)  Resp Rate         17 br/min  (MAY 13 09:07)  SBP      101 mmHg  (MAY 13 09:07)  DBP      64 mmHg  (MAY 13 09:07)  SpO2      94 %  (MAY 13 09:07)  Weight      92 kg  (MAY 13 09:07)  Height      165 cm  (MAY 13 09:07)  BMI      33.79  (MAY 13 09:07)      Impression and Plan   For colonoscopy to better define possible etiology of anemia.    VIRAJ Hernandez MD

## 2022-04-29 NOTE — H&P
Patient:   Sandra Woodson             MRN: [id number]            FIN: [id number]               Age:   48 years     Sex:  Female     :  1970   Associated Diagnoses:   None   Author:   Shaw Mawxell MD      DATE OF ADMIT: 2019 0:25  REFERRAL SOURCE: Deonte ELIAS   PCP: Keo Andrade MD     CHIEF COMPLAINT: back pain and SOB     HISTORY OF PRESENTING ILLNESS  Patient is a 48-year-old female with past medical history as below, who was just recently discharged from a hospital admission yesterday, during that admission underwent a workup for symptomatic anemia when she presented hypotensive and with a hemoglobin of 7.9, and received a total of 2 units of blood during that hospitalization.  Her occult stools were negative and there was no clear etiology of her anemia.  During that admission she was complaining of chest pain which was deemed to be not cardiac in origin, but it is noted she does have coronary artery disease and underwent PCI of her left main and left circumflex oin 3/2019 and is currently on dual antiplatelet therapy.  She presented to the ER this evening stating that since going home yesterday she has started to develop severe lower back pain.  She denied any falls or trauma to this area, or any prior history of back pain in the past.  She also reported that it was making it difficult for her to breathe.  On arrival to the ER she was tachypnea, no maintaining adequate oxygen saturations, and was significantly hypertensive.  She underwent a CTA of her chest which showed some perihilar and lower lobe interstitial edema, but no evidence of pulmonary embolus or dissection noted.  EKG this evening only showed normal sinus rhythm, her cardiac enzymes are within normal limits.  She was given IV analgesics in the en route to the hospitalist service for further evaluation.      REVIEW OF SYSTEMS  Except as documented, all other systems reviewed and negative    PAST MEDICAL HISTORY  CAD  status post PCI 3/2019  Hypertension  Hyperlipidemia  Hypothyroidism  Depression and anxiety    PAST SURGICAL HISTORY  knee meniscus: 2015  Knee meniscus: 2013  Oophorectomy: 2009  Cholecystectomy: 2005  H/O: hysterectomy: 2003    FAMILY HISTORY  Reviewed, noncontributory to current condition.    SOCIAL HISTORY  Denies tobacco drug or alcohol use    ALLERGIES  No Known Medication Allergies    HOME MEDICATIONS  alPRAzolam 1 mg oral tablet 1 mg = 1 tab(s), Oral, TID  aspirin 81 mg oral tablet, CHEWABLE 81 mg = 1 tab(s), Oral, Daily  atorvastatin 40 mg oral tablet 40 mg = 1 tab(s), Oral, Daily  ferrous sulfate 325 mg (65 mg elemental iron) oral delayed release tablet 325 mcmol/L = 1 tab(s), Oral, Daily  hydrOXYzine hydrochloride 25 mg oral tablet 25 mg = 1 tab(s), PRN, Oral, q8hr  ketorolac 10 mg oral tablet 10 mg = 1 tab(s), PRN, Oral, q6hr  levothyroxine 50 mcg (0.05 mg) oral tablet 50 mcg = 1 tab(s), Oral, qAM  lisinopril 10 mg oral tablet 10 mg = 1 tab(s), Oral, Daily  omega-3 polyunsaturated fatty acids ethyl esters 1000 mg oral capsule 2,000 mg = 2 cap(s), Oral, BID  Plavix 75 mg oral tablet 75 mg = 1 tab(s), Oral, Daily  sertraline 50 mg oral tablet 50 mg = 1 tab(s), Oral, Daily  tiZANidine 4 mg oral tablet 4 mg = 1 tab(s), PRN, Oral, q8hr  Toprol-XL 25 mg oral tablet, extended release 25 mg = 1 tab(s), Oral, Daily    PHYSICAL EXAM  Temp Oral     37.7 DegC  (APR 07 19:00)  Heart Rate Peripheral   72 bpm  (APR 07 23:50)  Resp Rate         12 br/min  (APR 07 23:50)  SBP      H 174mmHg  (APR 07 23:50)  DBP      83 mmHg  (APR 07 23:50)  SpO2      100 %  (APR 07 23:50)  GENERAL: awake, alert, oriented and in no acute distress  HEENT: NC/AT, EOMI, Pupils equal, CN 2-12 intact   LUNGS: CTA B/L, no rales or rhonchi, moving air well  CVS: Regular rate and rhythm, normal peripheral perfusion  ABD: Soft, non-tender, non-distended, bowel sounds present  Back: No significant tenderness to spinal palpation  EXTREMITIES: no  clubbing or cyanosis  SKIN: Warm, dry. No rashes or lesions  NEURO: AAOx3, no focal neurological deficit  PSYCHIATRIC: Cooperative    LABS  Chemistry  Hematology/Coagulation    Sodium Lvl: 144 mmol/L (04/07/19 20:06:00) PT: 13.8 second(s) (04/07/19 20:06:00)   Potassium Lvl: 4.3 mmol/L (04/07/19 20:06:00) INR: 1 (04/07/19 20:06:00)   Chloride: 111 mmol/L High (04/07/19 20:06:00) WBC: 6.3 x10(3)/mcL (04/07/19 20:06:00)   CO2: 24 mmol/L (04/07/19 20:06:00) RBC: 3.76 x10(6)/mcL Low (04/07/19 20:06:00)   Calcium Lvl: 8.1 mg/dL Low (04/07/19 20:06:00) Hgb: 10.8 gm/dL Low (04/07/19 20:06:00)   Magnesium Lvl: 1.8 mg/dL (04/07/19 20:06:00) Hct: 33.8 % Low (04/07/19 20:06:00)   Glucose Lvl: 95 mg/dL (04/07/19 20:06:00) Platelet: 188 x10(3)/mcL (04/07/19 20:06:00)   BUN: 30 mg/dL High (04/07/19 20:06:00) MCV: 89.9 fL (04/07/19 20:06:00)   Creatinine: 1.43 mg/dL High (04/07/19 20:06:00) MCH: 28.7 pg (04/07/19 20:06:00)   eGFR-AA: 50 mL/min/1.73 m2 (04/07/19 20:06:00) MCHC: 32 gm/dL Low (04/07/19 20:06:00)   eGFR-TANI: 41 mL/min/1.73 m2 (04/07/19 20:06:00) RDW: 14.6 % (04/07/19 20:06:00)   Bili Total: 0.3 mg/dL (04/07/19 20:06:00) MPV: 10.7 fL (04/07/19 20:06:00)   Bili Direct: 0.1 mg/dL (04/07/19 20:06:00) Abs Neut: 3.72 x10(3)/mcL (04/07/19 20:06:00)   Bili Indirect: 0.2 mg/dL (04/07/19 20:06:00) Neutro Auto: 60 % (04/07/19 20:06:00)   AST: 28 unit/L (04/07/19 20:06:00) Lymph Auto: 34 % (04/07/19 20:06:00)   ALT: 34 unit/L (04/07/19 20:06:00) Mono Auto: 5 % (04/07/19 20:06:00)   Alk Phos: 72 unit/L (04/07/19 20:06:00) Basophil Auto: 1 % (04/07/19 20:06:00)   Total Protein: 5.8 gm/dL Low (04/07/19 20:06:00) Abs Neutro: 3.72 x10(3)/mcL (04/07/19 20:06:00)   Albumin Lvl: 3.1 gm/dL Low (04/07/19 20:06:00) Abs Lymph: 2.2 x10(3)/mcL (04/07/19 20:06:00)   Globulin: 2.7 gm/dL (04/07/19 20:06:00) Abs Mono: 0.3 x10(3)/mcL (04/07/19 20:06:00)   A/G Ratio: 1.1 ratio (04/07/19 20:06:00) Abs Baso: 0 x10(3)/mcL (04/07/19 20:06:00)   POC  Troponin: 0 ng/mL (04/07/19 20:11:00)       RADIOLOGY  CT of the chest showed: No aortic dissection, perihilar and lower lobe interstitial pulmonary edema with a small right pleural effusion      ASSESSMENT  Lower back pain, acute   Dyspnea and tachypnea, with interstitial edema on imaging tonight and questionable small airway disease and prior CT  Uncontrolled hypertension  Atypical chest pain  Acute kidney injury on chronic kidney disease stage III    PLAN  - Continue analgesics as needed  - Aggressive afterload reduction, resume home antihypertensives, when necessary antihypertensives  - Holding renal sensitive medication  - MRIs of the thoracic and lumbar spine will be obtained  - given her persistent dyspnea and questionable small airway disease on prior CT will consult pulmonary for additional recommendations, though pulmonary edema from significantly uncontrolled hypertension and possible underlying diastolic dysfunction is definitely a strong consideration    ADVANCED DIRECTIVES: none fiull code   VTE PROPHYLAXIS: lovenox     Total time spent on admission: 71 minutes

## 2022-08-15 ENCOUNTER — HOSPITAL ENCOUNTER (OUTPATIENT)
Dept: HOSPITAL 67 - LABW | Age: 52
Discharge: HOME | End: 2022-08-15
Payer: COMMERCIAL

## 2022-08-15 DIAGNOSIS — Z51.11: Primary | ICD-10-CM

## 2022-08-15 DIAGNOSIS — D03.62: ICD-10-CM

## 2022-08-15 DIAGNOSIS — C50.411: ICD-10-CM

## 2022-08-15 LAB
PLATELET # BLD: 160 K/UL (ref 152–353)
POTASSIUM SERPL-SCNC: 4.4 MMOL/L (ref 3.6–5.2)
SODIUM SERPL-SCNC: 142 MMOL/L (ref 136–145)

## 2022-08-15 PROCEDURE — 80053 COMPREHEN METABOLIC PANEL: CPT

## 2022-08-15 PROCEDURE — 85027 COMPLETE CBC AUTOMATED: CPT

## 2022-08-15 PROCEDURE — 36415 COLL VENOUS BLD VENIPUNCTURE: CPT
